# Patient Record
Sex: FEMALE | Race: BLACK OR AFRICAN AMERICAN | NOT HISPANIC OR LATINO
[De-identification: names, ages, dates, MRNs, and addresses within clinical notes are randomized per-mention and may not be internally consistent; named-entity substitution may affect disease eponyms.]

---

## 2017-01-09 PROBLEM — Z00.00 ENCOUNTER FOR PREVENTIVE HEALTH EXAMINATION: Status: ACTIVE | Noted: 2017-01-09

## 2017-01-11 ENCOUNTER — APPOINTMENT (OUTPATIENT)
Dept: COLORECTAL SURGERY | Facility: CLINIC | Age: 22
End: 2017-01-11

## 2017-01-11 VITALS
HEIGHT: 64 IN | SYSTOLIC BLOOD PRESSURE: 144 MMHG | RESPIRATION RATE: 14 BRPM | HEART RATE: 86 BPM | DIASTOLIC BLOOD PRESSURE: 82 MMHG | TEMPERATURE: 97.7 F | WEIGHT: 140 LBS | BODY MASS INDEX: 23.9 KG/M2

## 2017-09-01 ENCOUNTER — EMERGENCY (EMERGENCY)
Facility: HOSPITAL | Age: 22
LOS: 1 days | Discharge: HOME | End: 2017-09-01
Attending: EMERGENCY MEDICINE | Admitting: EMERGENCY MEDICINE
Payer: MEDICAID

## 2017-09-01 VITALS
SYSTOLIC BLOOD PRESSURE: 115 MMHG | HEART RATE: 73 BPM | DIASTOLIC BLOOD PRESSURE: 87 MMHG | OXYGEN SATURATION: 100 % | TEMPERATURE: 99 F | RESPIRATION RATE: 18 BRPM

## 2017-09-01 VITALS
SYSTOLIC BLOOD PRESSURE: 131 MMHG | RESPIRATION RATE: 18 BRPM | DIASTOLIC BLOOD PRESSURE: 93 MMHG | HEART RATE: 80 BPM | TEMPERATURE: 99 F | OXYGEN SATURATION: 100 %

## 2017-09-01 PROCEDURE — 99283 EMERGENCY DEPT VISIT LOW MDM: CPT

## 2017-09-01 PROCEDURE — 99284 EMERGENCY DEPT VISIT MOD MDM: CPT

## 2017-09-01 RX ORDER — ACETAMINOPHEN 500 MG
650 TABLET ORAL ONCE
Qty: 0 | Refills: 0 | Status: COMPLETED | OUTPATIENT
Start: 2017-09-01 | End: 2017-09-01

## 2017-09-01 RX ADMIN — Medication 650 MILLIGRAM(S): at 21:54

## 2017-09-01 RX ADMIN — Medication 650 MILLIGRAM(S): at 22:00

## 2017-09-01 NOTE — ED PROVIDER NOTE - ENMT, MLM
Airway patent, Nasal mucosa clear. Mouth with normal mucosa. Throat has no vesicles, no oropharyngeal exudates and uvula is midline.  Slight enamel fracture L lower central incisor without exposed dentin or pulp, no sensitivity and no loose teeth, able to break tongue depressor between teeth, no TMJ tenderness

## 2017-09-01 NOTE — ED ADULT NURSE NOTE - CHPI ED SYMPTOMS NEG
no fever/no decreased eating/drinking/no bleeding gums/no ear pain/no mouth sores/no nasal congestion
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
## 2017-09-01 NOTE — ED ADULT NURSE NOTE - CINV DISCH TEACH PARTICIP
rec'd call from Traci with [facility] stating that patient was admitted with them over the weekend after having a stroke. They do not expect patient so survive but a few more days.  Traci can be reached at [phone] for questions/concerns.   Patient/Parent(s)

## 2017-09-01 NOTE — ED PROVIDER NOTE - NEURO NEGATIVE STATEMENT, MLM
no loss of consciousness, no gait abnormality, +mild headache, no sensory deficits, and no weakness.
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
## 2017-09-01 NOTE — ED PROVIDER NOTE - OBJECTIVE STATEMENT
22F no PMH p/w chipped tooth.  She was preparing to perform trumpet at a soccer game and was hit in the forehead by a ball.  She had no LOC, no vomiting, has a mild headache, no neck pain, no numbness/weakness.  She bit down hard and chipped her left lower central incisor.  It feels sharp and irregular but not sensitive.  Bite is not abnormal.  No loose teeth.

## 2017-09-01 NOTE — ED PROVIDER NOTE - CARE PLAN
Principal Discharge DX:	Chipped tooth  Instructions for follow-up, activity and diet:	See your dentist on Tuesday of this coming week  Avoid biting and chewing with your front teeth  Take tylenol 650 mg every 6 hours as needed for pain, max daily dose 3000 mg  Observe over next 24-48 hours for worsening/change in condition  Avoid activities that may cause repeat head injury, do not restart contact sports until evaluated by primary doctor or sports medicine doctor.  Ease into normal activities as tolerated.  Avoid strenuous mental activity, driving, and bright screens for 1-2 days.  Return to the emergency department for any change or worsening of condition such as altered consciousness, confusion, lethargy, severe pain, nausea/vomiting, or if you have any other questions or concerns.

## 2017-09-01 NOTE — ED PROVIDER NOTE - PLAN OF CARE
See your dentist on Tuesday of this coming week  Avoid biting and chewing with your front teeth  Take tylenol 650 mg every 6 hours as needed for pain, max daily dose 3000 mg  Observe over next 24-48 hours for worsening/change in condition  Avoid activities that may cause repeat head injury, do not restart contact sports until evaluated by primary doctor or sports medicine doctor.  Ease into normal activities as tolerated.  Avoid strenuous mental activity, driving, and bright screens for 1-2 days.  Return to the emergency department for any change or worsening of condition such as altered consciousness, confusion, lethargy, severe pain, nausea/vomiting, or if you have any other questions or concerns.

## 2018-02-09 ENCOUNTER — APPOINTMENT (OUTPATIENT)
Dept: COLORECTAL SURGERY | Facility: CLINIC | Age: 23
End: 2018-02-09
Payer: MEDICAID

## 2018-02-09 VITALS
HEIGHT: 64 IN | HEART RATE: 90 BPM | BODY MASS INDEX: 23.9 KG/M2 | TEMPERATURE: 97.7 F | SYSTOLIC BLOOD PRESSURE: 136 MMHG | WEIGHT: 140 LBS | RESPIRATION RATE: 14 BRPM | DIASTOLIC BLOOD PRESSURE: 98 MMHG

## 2018-02-09 PROCEDURE — 46600 DIAGNOSTIC ANOSCOPY SPX: CPT

## 2018-02-09 PROCEDURE — 99214 OFFICE O/P EST MOD 30 MIN: CPT | Mod: 25

## 2018-03-15 ENCOUNTER — OTHER (OUTPATIENT)
Age: 23
End: 2018-03-15

## 2018-03-19 ENCOUNTER — OUTPATIENT (OUTPATIENT)
Dept: OUTPATIENT SERVICES | Facility: HOSPITAL | Age: 23
LOS: 1 days | End: 2018-03-19
Payer: MEDICAID

## 2018-03-19 ENCOUNTER — APPOINTMENT (OUTPATIENT)
Dept: MRI IMAGING | Facility: CLINIC | Age: 23
End: 2018-03-19
Payer: MEDICAID

## 2018-03-19 DIAGNOSIS — Z00.8 ENCOUNTER FOR OTHER GENERAL EXAMINATION: ICD-10-CM

## 2018-03-19 PROCEDURE — 72195 MRI PELVIS W/O DYE: CPT

## 2018-03-19 PROCEDURE — 72195 MRI PELVIS W/O DYE: CPT | Mod: 26

## 2018-03-26 ENCOUNTER — EMERGENCY (EMERGENCY)
Facility: HOSPITAL | Age: 23
LOS: 1 days | Discharge: HOME | End: 2018-03-26
Attending: EMERGENCY MEDICINE | Admitting: EMERGENCY MEDICINE
Payer: COMMERCIAL

## 2018-03-26 VITALS
OXYGEN SATURATION: 100 % | DIASTOLIC BLOOD PRESSURE: 93 MMHG | HEART RATE: 72 BPM | RESPIRATION RATE: 18 BRPM | SYSTOLIC BLOOD PRESSURE: 147 MMHG | TEMPERATURE: 99 F

## 2018-03-26 PROCEDURE — 99282 EMERGENCY DEPT VISIT SF MDM: CPT

## 2018-03-26 RX ORDER — ACETAMINOPHEN 500 MG
650 TABLET ORAL ONCE
Qty: 0 | Refills: 0 | Status: COMPLETED | OUTPATIENT
Start: 2018-03-26 | End: 2018-03-26

## 2018-03-26 RX ADMIN — Medication 650 MILLIGRAM(S): at 22:25
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
## 2018-03-26 NOTE — ED PROVIDER NOTE - MUSCULOSKELETAL NECK EXAM
No C-spine tenderness, normal ROM, non-tender neck/no deformity, pain or tenderness. no restriction of movement
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
## 2018-04-13 ENCOUNTER — APPOINTMENT (OUTPATIENT)
Dept: COLORECTAL SURGERY | Facility: CLINIC | Age: 23
End: 2018-04-13
Payer: COMMERCIAL

## 2018-04-13 VITALS
DIASTOLIC BLOOD PRESSURE: 87 MMHG | HEIGHT: 64 IN | RESPIRATION RATE: 14 BRPM | WEIGHT: 145 LBS | BODY MASS INDEX: 24.75 KG/M2 | SYSTOLIC BLOOD PRESSURE: 132 MMHG | HEART RATE: 81 BPM

## 2018-04-13 DIAGNOSIS — R14.3 FLATULENCE: ICD-10-CM

## 2018-04-13 PROCEDURE — 99214 OFFICE O/P EST MOD 30 MIN: CPT

## 2018-07-02 ENCOUNTER — APPOINTMENT (OUTPATIENT)
Dept: UROGYNECOLOGY | Facility: CLINIC | Age: 23
End: 2018-07-02

## 2018-07-17 ENCOUNTER — APPOINTMENT (OUTPATIENT)
Dept: COLORECTAL SURGERY | Facility: CLINIC | Age: 23
End: 2018-07-17
Payer: MEDICAID

## 2018-07-17 VITALS
HEART RATE: 81 BPM | SYSTOLIC BLOOD PRESSURE: 120 MMHG | RESPIRATION RATE: 14 BRPM | DIASTOLIC BLOOD PRESSURE: 83 MMHG | HEIGHT: 64 IN | WEIGHT: 145 LBS | BODY MASS INDEX: 24.75 KG/M2 | TEMPERATURE: 97.9 F

## 2018-07-17 PROCEDURE — 46600 DIAGNOSTIC ANOSCOPY SPX: CPT

## 2018-07-17 PROCEDURE — 99214 OFFICE O/P EST MOD 30 MIN: CPT | Mod: 25

## 2018-11-21 NOTE — ED ADULT NURSE NOTE - NS TRANSFER PATIENT BELONGINGS
PHYSICIAN NEXT STEPS:   Review Only      CHIEF COMPLAINT:   Chief Complaint/Protocol Used: Cough   Onset: 5 days ago          ASSESSMENT:   » Did not know what to do True   » Onset: 5 days ago    » No, but easily roused True   » Shows appropriate response reaction to stimuli or requested action True   » Patient acting appropriately True   » Very decreased True   » Decreased True   » Normal True   » Normal, no trouble breathing True   » Severity: moderate    -------------------------------------------------------      DISPOSITION:   Disposition Recommendation: See Physician within 24 Hours   Questions that led to disposition:   » [1] Age > 1 year AND [2] continuous (non-stop) coughing keeps from feeding and sleeping AND [3] no improvement using cough treatment per guideline   Patient Directed To: Unspecified   Patient Intended Action: Seek care in the doctor's office          » Appointment made for patient at [facility] with Dr. Babin on 1/25/18 at 11:45AM.      DISPOSITION OVERRIDE/PROVIDER CONSULT:   Disposition Override: N/A   Override Source: Unspecified   Consulted with PCP: No   Consulted with On-Call Physician: No         CALLER CONTACT INFO:   Name: HMIA LUNA (Self)   Phone 1: [phone] (Home)   Phone 2: [phone] (Cell)   Phone 3: [phone] - Preferred         ENCOUNTER STARTED:   01/25/18 09:24:54 AM   ENCOUNTER ASSIGNED TO/CLOSED BY:   jonna piper @ 01/25/18 09:45:06 AM         -------------------------------------------------------      CARE ADVICE given per Cough guideline.   HOMEMADE COUGH MEDICINE:    * AGE: 3 Months to 1 year:   * Give warm clear fluids (e.g., water or apple juice) to thin the mucus and relax the airway. Dosage: 1-3 teaspoons (5-15 ml) four times per day.   * Note to [facility]: Option to be discussed only if caller complains that nothing else helps: Give a small amount of corn syrup. Dosage: 1/4 teaspoon (1 ml). Can give up to 4 times a day when coughing.  Caution: Avoid honey until 1 year old (Reason: risk for botulism).   * AGE 1 year and older: Use HONEY 1/2 to 1 tsp (2 to 5 ml) as needed as a homemade cough medicine. It can thin the secretions and loosen the cough. (If not available, can use corn syrup.)   * AGE 6 years and older: Use COUGH DROPS to decrease the tickle in the throat. (If not available, can use hard candy.); HUMIDIFIER:    * If the air is dry, use a humidifier in the bedroom (Reason: dry air makes coughs worse).    * Avoid menthol vapors (Reason: makes coughs worse).; FLUIDS - OFFER MORE:    * Encourage your child to drink adequate fluids to prevent dehydration.    * This will also thin out the nasal secretions and loosen the phlegm in the lungs.         UNDERSTANDS CARE ADVICE: Yes      AGREES WITH CARE ADVICE: Yes      WILL FOLLOW CARE ADVICE: Yes      -------------------------------------------------------      Electronically signed by:ANTOLIN LIGHT M.D.  Jan 25 2018 10:16AM CST     Clothing

## 2019-02-05 ENCOUNTER — OTHER (OUTPATIENT)
Age: 24
End: 2019-02-05

## 2019-02-05 DIAGNOSIS — K62.89 OTHER SPECIFIED DISEASES OF ANUS AND RECTUM: ICD-10-CM

## 2020-01-21 ENCOUNTER — TRANSCRIPTION ENCOUNTER (OUTPATIENT)
Age: 25
End: 2020-01-21

## 2021-07-27 ENCOUNTER — TRANSCRIPTION ENCOUNTER (OUTPATIENT)
Age: 26
End: 2021-07-27

## 2021-07-27 ENCOUNTER — APPOINTMENT (OUTPATIENT)
Dept: UROGYNECOLOGY | Facility: CLINIC | Age: 26
End: 2021-07-27
Payer: MEDICAID

## 2021-07-27 VITALS
TEMPERATURE: 97.9 F | HEART RATE: 88 BPM | WEIGHT: 146 LBS | DIASTOLIC BLOOD PRESSURE: 71 MMHG | HEIGHT: 64 IN | SYSTOLIC BLOOD PRESSURE: 121 MMHG | BODY MASS INDEX: 24.92 KG/M2 | OXYGEN SATURATION: 99 %

## 2021-07-27 DIAGNOSIS — M79.18 MYALGIA, OTHER SITE: ICD-10-CM

## 2021-07-27 DIAGNOSIS — R39.11 HESITANCY OF MICTURITION: ICD-10-CM

## 2021-07-27 DIAGNOSIS — R35.0 FREQUENCY OF MICTURITION: ICD-10-CM

## 2021-07-27 PROCEDURE — 99205 OFFICE O/P NEW HI 60 MIN: CPT

## 2021-07-27 RX ORDER — DIAZEPAM 100 %
POWDER (GRAM) MISCELLANEOUS
Qty: 30 | Refills: 0 | Status: ACTIVE | COMMUNITY
Start: 2021-07-27 | End: 1900-01-01

## 2021-07-27 NOTE — HISTORY OF PRESENT ILLNESS
[FreeTextEntry1] : This is a 26-year-old with a long colorectal history who indicates that at 18 years of age. She had blood in the stool. She saw GI and colorectal who felt it was increased gas, hemorrhoids, and increased pelvic floor tone. The anatomy was negative. She was given physical therapy for pelvic floor. Both vaginal and rectal stretching, but it did not help. She feels pelvic heaviness extracting pulsation in the rectum. She sexually active now. However 6 is previously painful, but after the physical therapy and was comfortable. She feels pulsation and discomfort in the rectum. She was first actually active at 14 years of age and had rectal 6 one to 2 times without since that time. She was constipated as a child and feels that the stool has to go over a ridge before being evacuated.  At times she has to strain but suggests she does not have a  major constipation problem at present.\par \par \par On examination, the general exam is normal. She is nondistended. The nares. Massive 5 out of 5 spasm global pelvic floor, bilateral in all muscle groups. The rectum is tight, and has banding bilaterally with pain to palpation of the puborectalis .  There is minimal vaginal prolapse. There is a small, but acute angle, perineal rectocele, examine this region. She does feel that this is the area where still becomes entrapped.\par \par is my impression that this patient has pelvic floor dysfunction with significant rectal symptoms including pain driving or dysfunction.  I am uncertain, whether it's related to her flatulence.  \par \par Recommendations\par \par digital reduction of  peroneocele - hesitant to recommend repair as any introduction of pain/ sutures will increase spasm. \par \par Consult with Dr Holden for CRS input on defec dysfunction & consider trans rectal botox / stretch\par \par self myofacial release -- she has learned techniques from pelvic pt and I have reviewed. \par \par Valium per vagina 5-10 mg (counseling off label and risks).   Consider trans rectal muscle relaxant - await Dr Holden input

## 2021-07-27 NOTE — LETTER BODY
[Dear  ___] : Dear  [unfilled], [I had the pleasure of evaluating your patient, [unfilled]. Thank you for referring Ms. [unfilled] for consultation for ___] : I had the pleasure of evaluating your patient, [unfilled]. Thank you for referring Ms. [unfilled] for consultation for [unfilled]. [Attached please find my note.] : Attached please find my note. [DrJamel  ___] : Dr. GURROLA

## 2021-07-28 RX ORDER — NORETHINDRONE ACETATE AND ETHINYL ESTRADIOL AND FERROUS FUMARATE 1MG-20(21)
KIT ORAL
Refills: 0 | Status: ACTIVE | COMMUNITY

## 2021-08-22 ENCOUNTER — TRANSCRIPTION ENCOUNTER (OUTPATIENT)
Age: 26
End: 2021-08-22

## 2021-09-23 ENCOUNTER — APPOINTMENT (OUTPATIENT)
Dept: SURGERY | Facility: CLINIC | Age: 26
End: 2021-09-23
Payer: MEDICAID

## 2021-09-23 VITALS
WEIGHT: 180 LBS | OXYGEN SATURATION: 99 % | BODY MASS INDEX: 29.99 KG/M2 | TEMPERATURE: 97.7 F | DIASTOLIC BLOOD PRESSURE: 90 MMHG | HEART RATE: 72 BPM | SYSTOLIC BLOOD PRESSURE: 133 MMHG | HEIGHT: 65 IN | RESPIRATION RATE: 16 BRPM

## 2021-09-23 PROCEDURE — 46600 DIAGNOSTIC ANOSCOPY SPX: CPT

## 2021-09-23 PROCEDURE — 99203 OFFICE O/P NEW LOW 30 MIN: CPT | Mod: 25

## 2021-09-23 RX ORDER — MULTIVITAMIN
TABLET ORAL
Refills: 0 | Status: ACTIVE | COMMUNITY

## 2021-09-23 NOTE — CONSULT LETTER
[Dear  ___] : Dear  [unfilled], [Consult Letter:] : I had the pleasure of evaluating your patient, [unfilled]. [Please see my note below.] : Please see my note below. [Consult Closing:] : Thank you very much for allowing me to participate in the care of this patient.  If you have any questions, please do not hesitate to contact me. [Sincerely,] : Sincerely, [DrJamel  ___] : Dr. GURROLA [FreeTextEntry2] : Dr. Abdon Eduardo [FreeTextEntry3] : Anant Holden M.D., PAM.MAR., F.URSULA.S.YADIRARJamelS.\[facility] Chief Colorectal Clinical Services, [place] [facility]

## 2021-09-23 NOTE — PHYSICAL EXAM
[Normal Breath Sounds] : Normal breath sounds [Normal Heart Sounds] : normal heart sounds [No Rash or Lesion] : No rash or lesion [Alert] : alert [Oriented to Person] : oriented to person [Oriented to Place] : oriented to place [Oriented to Time] : oriented to time [Calm] : calm [JVD] : no jugular venous distention  [de-identified] : WNL [de-identified] : WNL [de-identified] : ROM WNL [FreeTextEntry1] : Perianal inspection unremarkable.  Digital exam revealed a small rectocele.  Anoscopy unremarkable.

## 2021-09-23 NOTE — ASSESSMENT
[FreeTextEntry1] : I have reviewed chart and corroborated all nursing input into this note.  Patient's primary complaint is incontinence of flatus at this time.  She is able to evacuate stool successfully.  She occasionally has to splint to evacuate.  She has been through 2 courses of pelvic floor physical therapy and has undergone trials of Gas-X and charcoal pills.  She has eliminated gas producing foods from her diet.  However she continues to have episodes of incontinence of flatus.  Unfortunately, there is no other treatment available at this time to help with this problem.  There is no role for anal manometry since it will not alter management.  There is no role for Botox injections which can be used for sphincter spasm and obstructed defecation.  In this circumstance, it would only weaken the sphincters and potentially exacerbate the patient's incontinence.

## 2021-11-02 ENCOUNTER — APPOINTMENT (OUTPATIENT)
Dept: UROGYNECOLOGY | Facility: CLINIC | Age: 26
End: 2021-11-02
Payer: MEDICAID

## 2021-11-02 VITALS — SYSTOLIC BLOOD PRESSURE: 132 MMHG | TEMPERATURE: 97.9 F | DIASTOLIC BLOOD PRESSURE: 97 MMHG | HEART RATE: 83 BPM

## 2021-11-02 PROCEDURE — 99213 OFFICE O/P EST LOW 20 MIN: CPT

## 2021-11-02 NOTE — HISTORY OF PRESENT ILLNESS
[FreeTextEntry1] : 28 year old with defecatory dysfunction since young childhood.  Tobin chief complains of gas incontinence and feeling :" off during intercourse." Initial assessment noted high pelvic floor tone and small peroneal rectocele. \par \par Dr Holden assessment : no intervention or additional advisement. \par \par In the interim she had intercourse for the first time in sev yrs.  Her baseline colorectal problems improved follwing for 2 days and she felt better having something in vagina.  \par \par She felt same improvement using metrual cup.\par \par We discussed tyrial of pessaary.  It is certainly not expected with high pelvic tone and no prolapse to have relief as described but with her story it is reasonable to see how she feel with pessary in place.\par \par Gas problem by todays discussion seems to be intol of excess fiber and diet dominated by fiber.  Suggested adjusting diet\par \par return for pessary trial. \par \par Counseling was greater than 50 percent of encounter which included  32   minute face to face time for direct counseling.\par \par A chaperone was present for the entirety of the physical examination and for the exam room portion of patient interview\par

## 2021-11-15 ENCOUNTER — EMERGENCY (EMERGENCY)
Facility: HOSPITAL | Age: 26
LOS: 1 days | Discharge: HOME | End: 2021-11-15
Attending: STUDENT IN AN ORGANIZED HEALTH CARE EDUCATION/TRAINING PROGRAM
Payer: MEDICAID

## 2021-11-15 VITALS
SYSTOLIC BLOOD PRESSURE: 149 MMHG | TEMPERATURE: 98 F | OXYGEN SATURATION: 98 % | DIASTOLIC BLOOD PRESSURE: 93 MMHG | RESPIRATION RATE: 18 BRPM | HEART RATE: 86 BPM

## 2021-11-15 VITALS
HEIGHT: 65 IN | RESPIRATION RATE: 20 BRPM | WEIGHT: 190.04 LBS | TEMPERATURE: 98 F | SYSTOLIC BLOOD PRESSURE: 146 MMHG | HEART RATE: 89 BPM | DIASTOLIC BLOOD PRESSURE: 106 MMHG | OXYGEN SATURATION: 96 %

## 2021-11-15 PROCEDURE — 71045 X-RAY EXAM CHEST 1 VIEW: CPT

## 2021-11-15 PROCEDURE — 99284 EMERGENCY DEPT VISIT MOD MDM: CPT

## 2021-11-15 PROCEDURE — 99284 EMERGENCY DEPT VISIT MOD MDM: CPT | Mod: 25

## 2021-11-15 PROCEDURE — 94640 AIRWAY INHALATION TREATMENT: CPT

## 2021-11-15 PROCEDURE — 71045 X-RAY EXAM CHEST 1 VIEW: CPT | Mod: 26

## 2021-11-15 RX ORDER — DEXAMETHASONE 0.5 MG/5ML
6 ELIXIR ORAL ONCE
Refills: 0 | Status: COMPLETED | OUTPATIENT
Start: 2021-11-15 | End: 2021-11-15

## 2021-11-15 RX ORDER — IPRATROPIUM/ALBUTEROL SULFATE 18-103MCG
3 AEROSOL WITH ADAPTER (GRAM) INHALATION ONCE
Refills: 0 | Status: COMPLETED | OUTPATIENT
Start: 2021-11-15 | End: 2021-11-15

## 2021-11-15 RX ADMIN — Medication 6 MILLIGRAM(S): at 04:50

## 2021-11-15 RX ADMIN — Medication 3 MILLILITER(S): at 04:50

## 2021-11-15 RX ADMIN — Medication 3 MILLILITER(S): at 05:52

## 2021-11-15 NOTE — ED PROVIDER NOTE - NS ED ROS FT
CONSTITUTIONAL: No fevers, no chills, no lightheadedness, no dizziness  NOSE: no nasal congestion  MOUTH/THROAT: no sore throat  CV: No chest pain, no palpitations  RESP: + SOB, no cough  GI: No n/v  : no dysuria, no hematuria  SKIN: no rashes  NEURO: no headache, no focal weakness, no decreased sensation/paresthesias

## 2021-11-15 NOTE — ED PROVIDER NOTE - OBJECTIVE STATEMENT
26y F w/ no sig PMHx presenting with sob x2 days. States Saturday was at a party, got into her car to leave and noticed chest tightness and difficulty breathing. States she had trouble sleeping that night and yesterday, her symptoms persisted, with wheezing. Denies previous hx of asthma, but brother has dx of asthma. Denies fever, chills, cough, congestion, nausea, vomiting. Has allergy to tree nuts. Denies known trigger at party.

## 2021-11-15 NOTE — ED ADULT NURSE NOTE - OBJECTIVE STATEMENT
Pt is a 26 yr old female with no pmh only on BC coming from home for sob. Pt states she was out with friends this weekend and on Saturday afternoon, could not walk up the stairs without feeling sob. Pt states she has had chest tightness/wheezing since Saturday. Pt denies sick contacts, is fully vaccinated, and denies cough or fever. Pt is a/o x3- vitals stable- pt does have an upper airway wheeze upon auscultation.

## 2021-11-15 NOTE — ED PROVIDER NOTE - ATTENDING CONTRIBUTION TO CARE
26 year old female with no significant past medical history presented to ED with shortness of breath. No fever or URI symptom. No history of asthma. Pt well appearing on exam. speaking in full sentences, no retraction. +scattered wheezing noted. Likely new onset asthma. Plan: Albuterol, iv decadron. Reassess

## 2021-11-15 NOTE — ED PROVIDER NOTE - PATIENT PORTAL LINK FT
You can access the FollowMyHealth Patient Portal offered by [facility] by registering at the following website: [email or web address]. By joining [facility]’s FollowMyHealth portal, you will also be able to view your health information using other applications (apps) compatible with our system.

## 2021-11-15 NOTE — ED PROVIDER NOTE - CLINICAL SUMMARY MEDICAL DECISION MAKING FREE TEXT BOX
26y F w/ no sig PMHx presenting with sob x2 days. States Saturday was at a party, got into her car to leave and noticed chest tightness and difficulty breathing. +B/L end-exp wheezing appreciated. Not tachycardic, 02 sat 100% on RA, no resp distress, do not clinically suspect PE upon presentation. Will treat with duonebs and steroids, obtain CXR, reassess.

## 2021-11-15 NOTE — ED PROVIDER NOTE - PHYSICAL EXAMINATION
Physical Exam:  Gen: NAD, AOx3, non-toxic appearing, able to ambulate without assistance  HEENT:  tongue midline, oral mucosa moist  Lung: +B/L end-expiratory wheezing, no respiratory distress, speaking in full sentences  CV: RRR, no murmurs, rubs or gallops  Abd: soft, NT, ND  Neuro: No focal sensory or motor deficits  Skin: Warm, well perfused, no rash, no leg swelling  Psych: normal affect, calm  Lissa Villafuerte D.O. -Resident

## 2021-11-15 NOTE — ED PROVIDER NOTE - PROGRESS NOTE DETAILS
Christo BOWDEN (PGY-3): Wheezing improved after 1st duoneb, will give second and reassess. Patient remains comfortable appearing. Dr. Adrianne Reich: Pt reassessed at bedside. Wheezing resolved. Pt states she's feeling a lot better and wants to go home. Will discharge home with PCP follow up. Strict return precautions given. Dr. Adrianne Reich: Pt reassessed at bedside. Wheezing resolved. Pt states she's feeling a lot better and wants to go home. Will discharge home with PCP follow up. Albuterol MDI given to pt. Strict return precautions given.

## 2021-11-16 ENCOUNTER — TRANSCRIPTION ENCOUNTER (OUTPATIENT)
Age: 26
End: 2021-11-16

## 2021-11-19 ENCOUNTER — APPOINTMENT (OUTPATIENT)
Dept: UROGYNECOLOGY | Facility: CLINIC | Age: 26
End: 2021-11-19
Payer: MEDICAID

## 2021-11-19 VITALS
WEIGHT: 180 LBS | SYSTOLIC BLOOD PRESSURE: 118 MMHG | BODY MASS INDEX: 29.99 KG/M2 | HEART RATE: 72 BPM | DIASTOLIC BLOOD PRESSURE: 66 MMHG | HEIGHT: 65 IN | TEMPERATURE: 96.7 F | OXYGEN SATURATION: 98 %

## 2021-11-19 DIAGNOSIS — N81.9 FEMALE GENITAL PROLAPSE, UNSPECIFIED: ICD-10-CM

## 2021-11-19 PROCEDURE — A4562: CPT

## 2021-11-19 PROCEDURE — 99213 OFFICE O/P EST LOW 20 MIN: CPT

## 2021-11-19 NOTE — HISTORY OF PRESENT ILLNESS
[FreeTextEntry1] : This 26-year-old patient has a chief complaint of involuntary gas passing from the rectum. She had colorectal evaluation other than dietary modification. There is no other recommended intervention. The patient has noted that when using a vaginal cup. Premenstrual purposes. The rectal symptoms are markedly diminished. It is my office as that the space occupying cup is causing some impingement on the rectum to control the gas.\par \par A sinus observation to light of a pessary trial.\par \par Examination was performed. A #2 ring was seen to hold with voiding ambulation and Valsalva.   Instructions were insertion and removal were reviewed. Counseling regarding risks and benefits, and guidelines when to come for earlier intervention were described in detail.   She'll remove the pessary and leave it at one night per week.\par \par She will return in 3 months to advise us on habits as affected her symptom control\par \par \par Counseling was greater than 50 percent of encounter which included  36   minute face to face time for direct counseling.\par \par A chaperone was present for the entirety of the physical examination and for the exam room portion of patient interview\par

## 2021-11-29 ENCOUNTER — APPOINTMENT (OUTPATIENT)
Dept: COLORECTAL SURGERY | Facility: CLINIC | Age: 26
End: 2021-11-29
Payer: MEDICAID

## 2021-11-29 VITALS
HEART RATE: 71 BPM | WEIGHT: 180 LBS | HEIGHT: 65 IN | BODY MASS INDEX: 29.99 KG/M2 | DIASTOLIC BLOOD PRESSURE: 96 MMHG | SYSTOLIC BLOOD PRESSURE: 133 MMHG

## 2021-11-29 DIAGNOSIS — R15.9 FULL INCONTINENCE OF FECES: ICD-10-CM

## 2021-11-29 PROCEDURE — 46600 DIAGNOSTIC ANOSCOPY SPX: CPT

## 2021-11-29 PROCEDURE — 99203 OFFICE O/P NEW LOW 30 MIN: CPT | Mod: 25

## 2021-11-30 PROBLEM — R15.9 ANAL SPHINCTER INCONTINENCE: Status: ACTIVE | Noted: 2021-09-23

## 2021-11-30 NOTE — CONSULT LETTER
[Dear  ___] : Dear  [unfilled], [Consult Letter:] : I had the pleasure of evaluating your patient, [unfilled]. [Please see my note below.] : Please see my note below. [Consult Closing:] : Thank you very much for allowing me to participate in the care of this patient.  If you have any questions, please do not hesitate to contact me. [Sincerely,] : Sincerely, [FreeTextEntry3] : Mihai aPez MD\par

## 2021-11-30 NOTE — HISTORY OF PRESENT ILLNESS
[FreeTextEntry1] : 26 year old female who for consultation for pelvic floor issues.  She has been seen in the past for similar problems.  She complains of incontinence specific to flatus all long.  She has undergone  pelvic floor physical therapy which did not help her symptoms.  She describes episodes where she feels gas traveling down her lower back and she is unable to control it until it is too late.  She has no stool incontinence or leakage.  She previously had issues with constipation but this is improved.  She has noticed that the use of a menstrual cup actually helps her episodes of gas incontinence and she has been seen by your urogynecologist and plans to use a pessary to help this issue.  She has gained about 50 pounds over the past year due to the Covid pandemic.  She is in therapy to deal with stress. \par \par  Prior MRI defecography in 2018 showed small rectocele and lack of evacuation of rectal contrast. She splints to evacuate stool when constipated but rarely. \par

## 2021-11-30 NOTE — PHYSICAL EXAM
[Excoriation] : no perianal excoriation [Fistula] : no fistulas [Normal] : was normal [None] : there was no rectal mass  [Gross Blood] : no gross blood [Respiratory Effort] : normal respiratory effort [Normal Rate and Rhythm] : normal rate and rhythm [Calm] : calm [de-identified] : Soft, nontender, nondistended [de-identified] : grade 1 internal hemorrhoids [de-identified] : well appearing, in no distress [de-identified] : normocephalic, atraumatic [de-identified] : moves extremities without difficulty [de-identified] : warm and dry [de-identified] : alert and oriented x 3

## 2022-02-05 NOTE — ED ADULT TRIAGE NOTE - WILL THE PATIENT ACCEPT THE PFIZER COVID-19 VACCINE IF ELIGIBLE AND IT IS AVAILABLE?
· Continue Telemetry monitoring  · EKG revealed Afib with   · Currently not on rate control medication   · PRN Lopressor for rate control  · On warfarin 4 mg as outpatient- not taking PO meds, so changed to heparin gtt 1/31  All treatments discontinued on 02/04 for comfort care  Not applicable

## 2022-02-18 ENCOUNTER — APPOINTMENT (OUTPATIENT)
Dept: UROGYNECOLOGY | Facility: CLINIC | Age: 27
End: 2022-02-18

## 2022-05-13 ENCOUNTER — APPOINTMENT (OUTPATIENT)
Dept: UROGYNECOLOGY | Facility: CLINIC | Age: 27
End: 2022-05-13
Payer: COMMERCIAL

## 2022-05-13 VITALS
OXYGEN SATURATION: 98 % | DIASTOLIC BLOOD PRESSURE: 86 MMHG | TEMPERATURE: 97.9 F | WEIGHT: 180 LBS | HEIGHT: 65 IN | BODY MASS INDEX: 29.99 KG/M2 | HEART RATE: 75 BPM | SYSTOLIC BLOOD PRESSURE: 130 MMHG

## 2022-05-13 DIAGNOSIS — M62.838 OTHER MUSCLE SPASM: ICD-10-CM

## 2022-05-13 DIAGNOSIS — K59.00 CONSTIPATION, UNSPECIFIED: ICD-10-CM

## 2022-05-13 PROCEDURE — 99213 OFFICE O/P EST LOW 20 MIN: CPT

## 2022-05-13 NOTE — HISTORY OF PRESENT ILLNESS
[FreeTextEntry1] : 26 year old with chronic constipation and PFD.  Has had MRI shows rectocele and inc emptying of gel.  No prolapse.  Manages with PF phys therapy and vag diazepam.\par \par Her main complaint is gas passing per rectum as well as frequency of stool.  Her baseline is 2-4 bowel movements a day and if she has a salad she will have bowel movements between 4 and 8 times a day.\par \par \par She occasionally still has some clenching from pelvic floor spasm but I indicated I am reluctant to give her muscle relaxants as the rectal sphincter certainly will be more apt to seepage of gas which is one of her main complaints.  Pelvic floor spasm is certainly minor for her at this point.  Given that her dominant symptoms clearly seem to be the composition of stool stool transit and frequency of stool I have highly encouraged her to seek additional advisement through gastroenterology and nutrition.\par \par \par On examination the vagina has slightly increased tone but is not painful.  At the exam is otherwise negative.  The rectum has normal to slightly increased tone.\par \par \par This point she will have formal follow-up with my practice as I feel her focus needs to be with gastroenterology focusing on frequency and composition of stool and gas\par \par Review of previous notes, labs, current prescriptions was performed.\par History , Physical counseling was performed. \par All questions answered in lay language\par Total time for encounter was    39   min\par A chaperone was present for the entirety of the encounter\par \par

## 2022-09-30 ENCOUNTER — LABORATORY RESULT (OUTPATIENT)
Age: 27
End: 2022-09-30

## 2022-09-30 ENCOUNTER — APPOINTMENT (OUTPATIENT)
Dept: GYNECOLOGIC ONCOLOGY | Facility: CLINIC | Age: 27
End: 2022-09-30

## 2022-09-30 VITALS
SYSTOLIC BLOOD PRESSURE: 138 MMHG | HEART RATE: 96 BPM | BODY MASS INDEX: 29.99 KG/M2 | WEIGHT: 180 LBS | DIASTOLIC BLOOD PRESSURE: 89 MMHG | HEIGHT: 65 IN

## 2022-09-30 DIAGNOSIS — R87.619 UNSPECIFIED ABNORMAL CYTOLOGICAL FINDINGS IN SPECIMENS FROM CERVIX UTERI: ICD-10-CM

## 2022-09-30 DIAGNOSIS — Z87.19 PERSONAL HISTORY OF OTHER DISEASES OF THE DIGESTIVE SYSTEM: ICD-10-CM

## 2022-09-30 DIAGNOSIS — N90.3 DYSPLASIA OF VULVA, UNSPECIFIED: ICD-10-CM

## 2022-09-30 DIAGNOSIS — Z80.6 FAMILY HISTORY OF LEUKEMIA: ICD-10-CM

## 2022-09-30 DIAGNOSIS — Z80.42 FAMILY HISTORY OF MALIGNANT NEOPLASM OF PROSTATE: ICD-10-CM

## 2022-09-30 DIAGNOSIS — Z78.9 OTHER SPECIFIED HEALTH STATUS: ICD-10-CM

## 2022-09-30 DIAGNOSIS — Z82.49 FAMILY HISTORY OF ISCHEMIC HEART DISEASE AND OTHER DISEASES OF THE CIRCULATORY SYSTEM: ICD-10-CM

## 2022-09-30 LAB
BASOPHILS # BLD AUTO: 0.09 K/UL
BASOPHILS NFR BLD AUTO: 1.5 %
EOSINOPHIL # BLD AUTO: 0.25 K/UL
EOSINOPHIL NFR BLD AUTO: 4.2 %
HCT VFR BLD CALC: 39.1 %
HGB BLD-MCNC: 12.4 G/DL
IMM GRANULOCYTES NFR BLD AUTO: 0.3 %
LYMPHOCYTES # BLD AUTO: 2.79 K/UL
LYMPHOCYTES NFR BLD AUTO: 47.4 %
MAN DIFF?: NORMAL
MCHC RBC-ENTMCNC: 27.3 PG
MCHC RBC-ENTMCNC: 31.7 GM/DL
MCV RBC AUTO: 86.1 FL
MONOCYTES # BLD AUTO: 0.33 K/UL
MONOCYTES NFR BLD AUTO: 5.6 %
NEUTROPHILS # BLD AUTO: 2.41 K/UL
NEUTROPHILS NFR BLD AUTO: 41 %
PLATELET # BLD AUTO: 280 K/UL
RBC # BLD: 4.54 M/UL
RBC # FLD: 14.6 %
WBC # FLD AUTO: 5.89 K/UL

## 2022-09-30 PROCEDURE — 99205 OFFICE O/P NEW HI 60 MIN: CPT

## 2022-10-01 LAB
HIV1+2 AB SPEC QL IA.RAPID: NONREACTIVE
HPV HIGH+LOW RISK DNA PNL CVX: NOT DETECTED

## 2022-10-02 LAB — T PALLIDUM AB SER QL IA: NEGATIVE

## 2022-10-07 LAB — CYTOLOGY CVX/VAG DOC THIN PREP: ABNORMAL

## 2022-10-13 ENCOUNTER — OUTPATIENT (OUTPATIENT)
Dept: OUTPATIENT SERVICES | Facility: HOSPITAL | Age: 27
LOS: 1 days | End: 2022-10-13
Payer: COMMERCIAL

## 2022-10-13 VITALS
WEIGHT: 179.9 LBS | DIASTOLIC BLOOD PRESSURE: 84 MMHG | OXYGEN SATURATION: 100 % | RESPIRATION RATE: 16 BRPM | TEMPERATURE: 99 F | HEIGHT: 65 IN | SYSTOLIC BLOOD PRESSURE: 128 MMHG | HEART RATE: 78 BPM

## 2022-10-13 DIAGNOSIS — Z01.812 ENCOUNTER FOR PREPROCEDURAL LABORATORY EXAMINATION: ICD-10-CM

## 2022-10-13 DIAGNOSIS — R87.619 UNSPECIFIED ABNORMAL CYTOLOGICAL FINDINGS IN SPECIMENS FROM CERVIX UTERI: ICD-10-CM

## 2022-10-13 DIAGNOSIS — N90.3 DYSPLASIA OF VULVA, UNSPECIFIED: ICD-10-CM

## 2022-10-13 DIAGNOSIS — Z78.9 OTHER SPECIFIED HEALTH STATUS: Chronic | ICD-10-CM

## 2022-10-13 LAB
ANION GAP SERPL CALC-SCNC: 8 MMOL/L — SIGNIFICANT CHANGE UP (ref 5–17)
APTT BLD: 32.4 SEC — SIGNIFICANT CHANGE UP (ref 27.5–35.5)
BLD GP AB SCN SERPL QL: SIGNIFICANT CHANGE UP
BUN SERPL-MCNC: 12 MG/DL — SIGNIFICANT CHANGE UP (ref 7–18)
CALCIUM SERPL-MCNC: 9.3 MG/DL — SIGNIFICANT CHANGE UP (ref 8.4–10.5)
CHLORIDE SERPL-SCNC: 111 MMOL/L — HIGH (ref 96–108)
CO2 SERPL-SCNC: 23 MMOL/L — SIGNIFICANT CHANGE UP (ref 22–31)
CREAT SERPL-MCNC: 0.92 MG/DL — SIGNIFICANT CHANGE UP (ref 0.5–1.3)
EGFR: 89 ML/MIN/1.73M2 — SIGNIFICANT CHANGE UP
GLUCOSE SERPL-MCNC: 101 MG/DL — HIGH (ref 70–99)
HCT VFR BLD CALC: 39.3 % — SIGNIFICANT CHANGE UP (ref 34.5–45)
HGB BLD-MCNC: 12.3 G/DL — SIGNIFICANT CHANGE UP (ref 11.5–15.5)
INR BLD: 1.03 RATIO — SIGNIFICANT CHANGE UP (ref 0.88–1.16)
MCHC RBC-ENTMCNC: 26.9 PG — LOW (ref 27–34)
MCHC RBC-ENTMCNC: 31.3 GM/DL — LOW (ref 32–36)
MCV RBC AUTO: 85.8 FL — SIGNIFICANT CHANGE UP (ref 80–100)
NRBC # BLD: 0 /100 WBCS — SIGNIFICANT CHANGE UP (ref 0–0)
PLATELET # BLD AUTO: 274 K/UL — SIGNIFICANT CHANGE UP (ref 150–400)
POTASSIUM SERPL-MCNC: 5.1 MMOL/L — SIGNIFICANT CHANGE UP (ref 3.5–5.3)
POTASSIUM SERPL-SCNC: 5.1 MMOL/L — SIGNIFICANT CHANGE UP (ref 3.5–5.3)
PROTHROM AB SERPL-ACNC: 12.3 SEC — SIGNIFICANT CHANGE UP (ref 10.5–13.4)
RBC # BLD: 4.58 M/UL — SIGNIFICANT CHANGE UP (ref 3.8–5.2)
RBC # FLD: 15.4 % — HIGH (ref 10.3–14.5)
SODIUM SERPL-SCNC: 142 MMOL/L — SIGNIFICANT CHANGE UP (ref 135–145)
WBC # BLD: 5.36 K/UL — SIGNIFICANT CHANGE UP (ref 3.8–10.5)
WBC # FLD AUTO: 5.36 K/UL — SIGNIFICANT CHANGE UP (ref 3.8–10.5)

## 2022-10-13 PROCEDURE — G0463: CPT

## 2022-10-13 RX ORDER — SODIUM CHLORIDE 9 MG/ML
3 INJECTION INTRAMUSCULAR; INTRAVENOUS; SUBCUTANEOUS EVERY 8 HOURS
Refills: 0 | Status: DISCONTINUED | OUTPATIENT
Start: 2022-10-20 | End: 2022-11-03

## 2022-10-13 NOTE — H&P PST ADULT - ASSESSMENT
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

## 2022-10-13 NOTE — H&P PST ADULT - REASON FOR ADMISSION
Exam under anesthesia colposcopy wide local excision of vulva possible cervical biopsy possible vaginal biopsy

## 2022-10-13 NOTE — H&P PST ADULT - PROBLEM SELECTOR PLAN 1
schedule for Exam under anesthesia colposcopy wide local excision of vulva possible cervical bx possible vaginal bx.      Pt instructed to be NPO the night and the morning of surgery. Provided with chlorhexidene 4% solution to wash for 3 days including the day of surgery. Given written instructions sheet. Escort required post procedure.    Stop Bang Score=0 Pt low risk for YANELY

## 2022-10-13 NOTE — H&P PST ADULT - NSICDXFAMILYHX_GEN_ALL_CORE_FT
FAMILY HISTORY:  Father  Still living? Yes, Estimated age: 69  FHx: prostate cancer, Age at diagnosis: Age Unknown    Mother  Still living? Yes, Estimated age: 60  FH: CML (chronic myeloid leukemia), Age at diagnosis: Age Unknown

## 2022-10-13 NOTE — H&P PST ADULT - ATTENDING COMMENTS
Patient seen in ASU with her mother present. No changes reported, consent reviewed including examination by learner. All questions answered to the patient and her mothers apparent satisfaction.     Pippa Bravo MD

## 2022-10-13 NOTE — H&P PST ADULT - FALL HARM RISK - UNIVERSAL INTERVENTIONS
Bed in lowest position, wheels locked, appropriate side rails in place/Call bell, personal items and telephone in reach/Instruct patient to call for assistance before getting out of bed or chair/Non-slip footwear when patient is out of bed/[place] to call system/Physically safe environment - no spills, clutter or unnecessary equipment/Purposeful Proactive Rounding/Room/bathroom lighting operational, light cord in reach

## 2022-10-13 NOTE — H&P PST ADULT - NSANTHOSAYNRD_GEN_A_CORE
No. YANELY screening performed.  STOP BANG Legend: 0-2 = LOW Risk; 3-4 = INTERMEDIATE Risk; 5-8 = HIGH Risk

## 2022-10-13 NOTE — H&P PST ADULT - HAVE YOU HAD A FIRST COVID-19 BOOSTER?
Yaneth Gavin [address] [facility] PHYSICAL THERAPY  [address] [facility] [place], [address] - Phone: [phone]  Fax: [phone]  DISCHARGE SUMMARY FOR PHYSICAL THERAPY          Patient Name: Neri Salazar : 1973   Treatment/Medical Diagnosis: Generalized muscle weakness [M62.81]  Stroke [facility]) [I63.9]   Onset Date: Chronic, CVA 2014      Referral Source: Karine Ramon [facility]): 2016   Prior Hospitalization: See Medical History Provider #: [id number]   Prior Level of Function: WC dependent since CVA in 2014; requires assistance with bed mobility   Comorbidities: HTN   Medications: Verified on Patient Summary List   Visits from [facility]: 3 Missed Visits: 0     Goal/Measure of Progress Goal Met? 1. Patient will require min A X 1 with rolling to R side to allow caregivers greater ease with ADL's. Status at last Eval: Mod/max A x 1 Current Status: NT no   2. Patient will require mod A X 1 with sit <> supine tranfers to improve independence with transfers and decrease load on caregiver. Status at last Eval: Max A x 1 Current Status: NT no   3. Patient will be independent with home exercise program.   Status at last Eval: Not initiated Current Status: Family reports pt performing LE AROM sitting in w/c progressing     Key Functional Changes/Progress: Patient progress has been limited by inability to transfer out of w/c in clinic due to concerns regarding safety of transferring without [facility] lift due to disintegration of skull flap reported by patient's family; have not received response from MD regarding whether patient can safely transfer out of w/c without [facility] lift.   Patient has performed gentle LE ROM/strengthening exercises in w/c.  LE strength is as follows:  L hip flex = 3-/5, R hip flex = 3/5  L hip abd/add = 2/5, R hip abd = 3-/5  B knee ext = 4/5  B knee flex = 4/5  L ankle DF = 3-/5, R ankle DF = 4/5  L ankle PF = 3/5, R ankle DF = 4-/5    G-Codes (GP): Mobility  [id number] Goal  CK= 40-59% [id number] D/C  CN= 100%. The severity rating is based on the Other Supine to sit transfer    Assessments/Recommendations: Other: Discontinue therapy. Scheduled for surgery 2/7/2017. Will need new prescription to resume PT after surgery as appropriate. If you have any questions/comments please contact us directly at [phone]. Thank you for allowing us to assist in the care of your patient. Therapist Signature: Viviana Mcconnell, PT Date: 2/2/2017   Reporting Period: 1/19/2017-2/2/2017 Time: 11:11 AM     NOTE TO PHYSICIAN:  PLEASE COMPLETE THE ORDERS BELOW AND FAX TO   [facility] Physical Therapy: [phone]. If you are unable to process this request in 24 hours please contact our office: [phone].    ___ I have read the above report and request that my patient be discharged from therapy.      Physician Signature:        Date:______Time: Yes

## 2022-10-14 ENCOUNTER — OUTPATIENT (OUTPATIENT)
Dept: OUTPATIENT SERVICES | Facility: HOSPITAL | Age: 27
LOS: 1 days | End: 2022-10-14

## 2022-10-14 DIAGNOSIS — N90.1 MODERATE VULVAR DYSPLASIA: ICD-10-CM

## 2022-10-14 DIAGNOSIS — Z78.9 OTHER SPECIFIED HEALTH STATUS: Chronic | ICD-10-CM

## 2022-10-15 PROBLEM — N90.3 DYSPLASIA OF VULVA, UNSPECIFIED: Chronic | Status: ACTIVE | Noted: 2022-10-13

## 2022-10-16 ENCOUNTER — NON-APPOINTMENT (OUTPATIENT)
Age: 27
End: 2022-10-16

## 2022-10-17 ENCOUNTER — RESULT REVIEW (OUTPATIENT)
Age: 27
End: 2022-10-17

## 2022-10-17 LAB — SURGICAL PATHOLOGY STUDY: SIGNIFICANT CHANGE UP

## 2022-10-19 ENCOUNTER — TRANSCRIPTION ENCOUNTER (OUTPATIENT)
Age: 27
End: 2022-10-19

## 2022-10-19 ENCOUNTER — NON-APPOINTMENT (OUTPATIENT)
Age: 27
End: 2022-10-19

## 2022-10-20 ENCOUNTER — RESULT REVIEW (OUTPATIENT)
Age: 27
End: 2022-10-20

## 2022-10-20 ENCOUNTER — OUTPATIENT (OUTPATIENT)
Dept: OUTPATIENT SERVICES | Facility: HOSPITAL | Age: 27
LOS: 1 days | End: 2022-10-20
Payer: COMMERCIAL

## 2022-10-20 ENCOUNTER — TRANSCRIPTION ENCOUNTER (OUTPATIENT)
Age: 27
End: 2022-10-20

## 2022-10-20 ENCOUNTER — APPOINTMENT (OUTPATIENT)
Dept: GYNECOLOGIC ONCOLOGY | Facility: HOSPITAL | Age: 27
End: 2022-10-20

## 2022-10-20 VITALS
OXYGEN SATURATION: 100 % | SYSTOLIC BLOOD PRESSURE: 126 MMHG | DIASTOLIC BLOOD PRESSURE: 85 MMHG | RESPIRATION RATE: 16 BRPM | HEART RATE: 67 BPM | TEMPERATURE: 98 F

## 2022-10-20 VITALS
HEIGHT: 65 IN | RESPIRATION RATE: 16 BRPM | SYSTOLIC BLOOD PRESSURE: 124 MMHG | WEIGHT: 179.9 LBS | TEMPERATURE: 99 F | DIASTOLIC BLOOD PRESSURE: 89 MMHG | OXYGEN SATURATION: 100 % | HEART RATE: 84 BPM

## 2022-10-20 DIAGNOSIS — Z78.9 OTHER SPECIFIED HEALTH STATUS: Chronic | ICD-10-CM

## 2022-10-20 DIAGNOSIS — Z01.812 ENCOUNTER FOR PREPROCEDURAL LABORATORY EXAMINATION: ICD-10-CM

## 2022-10-20 DIAGNOSIS — R87.619 UNSPECIFIED ABNORMAL CYTOLOGICAL FINDINGS IN SPECIMENS FROM CERVIX UTERI: ICD-10-CM

## 2022-10-20 LAB
BLD GP AB SCN SERPL QL: SIGNIFICANT CHANGE UP
HCG UR QL: NEGATIVE — SIGNIFICANT CHANGE UP

## 2022-10-20 PROCEDURE — 81025 URINE PREGNANCY TEST: CPT

## 2022-10-20 PROCEDURE — 88360 TUMOR IMMUNOHISTOCHEM/MANUAL: CPT

## 2022-10-20 PROCEDURE — 88341 IMHCHEM/IMCYTCHM EA ADD ANTB: CPT

## 2022-10-20 PROCEDURE — 86850 RBC ANTIBODY SCREEN: CPT

## 2022-10-20 PROCEDURE — 88341 IMHCHEM/IMCYTCHM EA ADD ANTB: CPT | Mod: 26,59

## 2022-10-20 PROCEDURE — 36415 COLL VENOUS BLD VENIPUNCTURE: CPT

## 2022-10-20 PROCEDURE — 56620 VULVECTOMY SIMPLE PARTIAL: CPT | Mod: 59

## 2022-10-20 PROCEDURE — 57500 BIOPSY OF CERVIX: CPT

## 2022-10-20 PROCEDURE — 88305 TISSUE EXAM BY PATHOLOGIST: CPT | Mod: 26

## 2022-10-20 PROCEDURE — 57454 BX/CURETT OF CERVIX W/SCOPE: CPT

## 2022-10-20 PROCEDURE — 88360 TUMOR IMMUNOHISTOCHEM/MANUAL: CPT | Mod: 26

## 2022-10-20 PROCEDURE — 88305 TISSUE EXAM BY PATHOLOGIST: CPT

## 2022-10-20 PROCEDURE — 86900 BLOOD TYPING SEROLOGIC ABO: CPT

## 2022-10-20 PROCEDURE — 12044 INTMD RPR N-HF/GENIT7.6-12.5: CPT | Mod: XS

## 2022-10-20 PROCEDURE — 88342 IMHCHEM/IMCYTCHM 1ST ANTB: CPT

## 2022-10-20 PROCEDURE — 57505 ENDOCERVICAL CURETTAGE: CPT

## 2022-10-20 PROCEDURE — 86901 BLOOD TYPING SEROLOGIC RH(D): CPT

## 2022-10-20 PROCEDURE — 11623 EXC S/N/H/F/G MAL+MRG 2.1-3: CPT

## 2022-10-20 PROCEDURE — 88342 IMHCHEM/IMCYTCHM 1ST ANTB: CPT | Mod: 26,59

## 2022-10-20 RX ORDER — ONDANSETRON 8 MG/1
8 TABLET, FILM COATED ORAL ONCE
Refills: 0 | Status: DISCONTINUED | OUTPATIENT
Start: 2022-10-20 | End: 2022-10-20

## 2022-10-20 RX ORDER — OXYCODONE HYDROCHLORIDE 5 MG/1
1 TABLET ORAL
Qty: 10 | Refills: 0
Start: 2022-10-20

## 2022-10-20 RX ORDER — SENNA PLUS 8.6 MG/1
2 TABLET ORAL
Qty: 20 | Refills: 0
Start: 2022-10-20 | End: 2022-10-29

## 2022-10-20 RX ORDER — FENTANYL CITRATE 50 UG/ML
25 INJECTION INTRAVENOUS
Refills: 0 | Status: DISCONTINUED | OUTPATIENT
Start: 2022-10-20 | End: 2022-10-20

## 2022-10-20 RX ORDER — ACETAMINOPHEN 500 MG
3 TABLET ORAL
Qty: 0 | Refills: 0 | DISCHARGE

## 2022-10-20 RX ORDER — CHOLECALCIFEROL (VITAMIN D3) 125 MCG
75 CAPSULE ORAL
Qty: 0 | Refills: 0 | DISCHARGE

## 2022-10-20 RX ORDER — DOCUSATE SODIUM 100 MG
1 CAPSULE ORAL
Qty: 10 | Refills: 0
Start: 2022-10-20 | End: 2022-10-29

## 2022-10-20 RX ORDER — HYDROMORPHONE HYDROCHLORIDE 2 MG/ML
1 INJECTION INTRAMUSCULAR; INTRAVENOUS; SUBCUTANEOUS
Refills: 0 | Status: DISCONTINUED | OUTPATIENT
Start: 2022-10-20 | End: 2022-10-20

## 2022-10-20 RX ORDER — IBUPROFEN 200 MG
1 TABLET ORAL
Qty: 0 | Refills: 0 | DISCHARGE

## 2022-10-20 NOTE — ASU PATIENT PROFILE, ADULT - FALL HARM RISK - UNIVERSAL INTERVENTIONS
Normally functioning per echo 11/2017.   Bed in lowest position, wheels locked, appropriate side rails in place/Call bell, personal items and telephone in reach/Instruct patient to call for assistance before getting out of bed or chair/Non-slip footwear when patient is out of bed/[place] to call system/Physically safe environment - no spills, clutter or unnecessary equipment/Purposeful Proactive Rounding/Room/bathroom lighting operational, light cord in reach

## 2022-10-20 NOTE — ASU DISCHARGE PLAN (ADULT/PEDIATRIC) - MEDICATION INSTRUCTIONS
Oxycodone 5mg every 6 hours as needed for severe pain has been sent to your pharmacy. Alternate using Motrin 600mg every 6 hours and Tylenol 975mg (2 extra strength or 3 regular tabs) every 6 hours for pain. Example pain schedule as follows: 9AM Tylenol 975mg, 12PM Motrin 600mg, 3PM Tylenol 975mg, 6PM Motrin 600mg, etc.

## 2022-10-20 NOTE — ASU DISCHARGE PLAN (ADULT/PEDIATRIC) - CALL YOUR DOCTOR IF YOU HAVE ANY OF THE FOLLOWING:
Bleeding that does not stop/Wound/Surgical Site with redness, or foul smelling discharge or pus/Unable to urinate Bleeding that does not stop/Fever greater than (need to indicate Fahrenheit or Celsius)/Wound/Surgical Site with redness, or foul smelling discharge or pus/Unable to urinate

## 2022-10-20 NOTE — BRIEF OPERATIVE NOTE - NSICDXBRIEFPREOP_GEN_ALL_CORE_FT
PRE-OP DIAGNOSIS:  Unspecified abnormal cytological findings in specimens from cervix uteri 20-Oct-2022 14:15:04  Tom Drake  Dysplasia of vulva, unspecified 20-Oct-2022 14:15:15  Tom Drake

## 2022-10-20 NOTE — ASU DISCHARGE PLAN (ADULT/PEDIATRIC) - CARE PROVIDER_API CALL
Pippa Garcia)  OBSGYN  Oncology  [address] [place] [address]  Phone: [phone]  Fax: [phone]  Established Patient  Follow Up Time: 2 weeks

## 2022-10-20 NOTE — BRIEF OPERATIVE NOTE - SPECIMENS
Endocervical curettings, 3o'clock and 6o'clock cervical biopsies, left superior labium majus lesion, left inferior labium majus lesion, and perianal biopsy

## 2022-10-20 NOTE — ASU DISCHARGE PLAN (ADULT/PEDIATRIC) - NS MD DC FALL RISK RISK
For information on Fall & Injury Prevention, visit: [email or web address] OR  [email or web address] OR  https://www.cdc.gov/steadi/patient.html

## 2022-10-20 NOTE — BRIEF OPERATIVE NOTE - OPERATION/FINDINGS
EUA revealed previously biopsied 5 mm lesion noted with aceto-white change on left labium majus along with 2mm additional aceto-white change noted directly below previously biopsied lesion. 1mm raised hyperkeratotic lesion noted on inferior portion of left labium majus. 8-10mm raised hyperkeratotic lesions noted in midline of perineum. 6 wk size anteverted uterus. Cervix with aceto-white changes noted at 3o'clock and 6o'clock positions. No adnexal fullness. Endocervical curettings, 3o'clock and 6o'clock cervical biopsies, left superior labium majus lesion, left inferior labium majus lesion, and perianal biopsy sent to pathology. Excellent hemostasis noted. EUA revealed previously biopsied 5 mm left labial lesion noted with aceto-white change on left labium majus along with 2mm additional aceto-white change noted directly below previously biopsied lesion. 1mm raised hyperkeratotic lesion noted on inferior portion of left labium majus. 8-10mm raised hyperkeratotic lesions noted in midline of perineum. 6 wk size anteverted uterus. Cervix with aceto-white changes noted at 3o'clock and 6o'clock positions. No adnexal fullness. Endocervical curettings, 3o'clock and 6o'clock cervical biopsies, left superior labium majus lesion, left inferior labium majus lesion, and perianal biopsy sent to pathology. Excellent hemostasis noted.

## 2022-10-20 NOTE — BRIEF OPERATIVE NOTE - NSICDXBRIEFPOSTOP_GEN_ALL_CORE_FT
POST-OP DIAGNOSIS:  Unspecified abnormal cytological findings in specimens from cervix uteri 20-Oct-2022 14:15:20  Tom Drake  Dysplasia of vulva, unspecified 20-Oct-2022 14:15:28  Tom Drake

## 2022-10-20 NOTE — BRIEF OPERATIVE NOTE - NSICDXBRIEFPROCEDURE_GEN_ALL_CORE_FT
PROCEDURES:  Exam under anesthesia, pelvic 20-Oct-2022 14:12:39  Tom Drake  Cervical biopsy 20-Oct-2022 14:12:58  Tom Drake  Wide local excision, vulva, with vulvar colposcopy 20-Oct-2022 14:13:40  Tom Drake  Perineal biopsy 20-Oct-2022 14:13:55  Tom Drake  D&C (dilatation and curettage, scraping of uterus) 20-Oct-2022 14:14:20  Tom Drake

## 2022-10-21 ENCOUNTER — NON-APPOINTMENT (OUTPATIENT)
Age: 27
End: 2022-10-21

## 2022-10-28 LAB — SURGICAL PATHOLOGY STUDY: SIGNIFICANT CHANGE UP

## 2022-11-02 ENCOUNTER — NON-APPOINTMENT (OUTPATIENT)
Age: 27
End: 2022-11-02

## 2022-11-04 ENCOUNTER — APPOINTMENT (OUTPATIENT)
Dept: GYNECOLOGIC ONCOLOGY | Facility: CLINIC | Age: 27
End: 2022-11-04

## 2022-11-22 ENCOUNTER — APPOINTMENT (OUTPATIENT)
Dept: GYNECOLOGIC ONCOLOGY | Facility: CLINIC | Age: 27
End: 2022-11-22

## 2022-11-22 VITALS
DIASTOLIC BLOOD PRESSURE: 80 MMHG | HEIGHT: 65 IN | WEIGHT: 180 LBS | TEMPERATURE: 97.2 F | BODY MASS INDEX: 29.99 KG/M2 | SYSTOLIC BLOOD PRESSURE: 128 MMHG

## 2022-11-22 PROCEDURE — 99497 ADVNCD CARE PLAN 30 MIN: CPT

## 2022-11-22 PROCEDURE — 99024 POSTOP FOLLOW-UP VISIT: CPT

## 2022-11-22 RX ORDER — DOCUSATE SODIUM 100 MG/1
100 CAPSULE, LIQUID FILLED ORAL
Qty: 10 | Refills: 0 | Status: ACTIVE | COMMUNITY
Start: 2022-10-20

## 2022-11-22 RX ORDER — SENNOSIDES 8.6 MG/1
8.6 TABLET, COATED ORAL
Qty: 20 | Refills: 0 | Status: ACTIVE | COMMUNITY
Start: 2022-10-20

## 2022-11-22 RX ORDER — OXYCODONE 5 MG/1
5 TABLET ORAL
Qty: 10 | Refills: 0 | Status: ACTIVE | COMMUNITY
Start: 2022-10-20

## 2023-01-07 ENCOUNTER — NON-APPOINTMENT (OUTPATIENT)
Age: 28
End: 2023-01-07

## 2023-01-14 PROBLEM — Z48.89 POSTOPERATIVE VISIT: Status: ACTIVE | Noted: 2023-01-14

## 2023-01-14 PROBLEM — Z48.89 POSTOPERATIVE VISIT: Status: RESOLVED | Noted: 2022-11-22 | Resolved: 2023-01-14

## 2023-01-17 ENCOUNTER — APPOINTMENT (OUTPATIENT)
Dept: GYNECOLOGIC ONCOLOGY | Facility: CLINIC | Age: 28
End: 2023-01-17
Payer: COMMERCIAL

## 2023-01-17 VITALS
DIASTOLIC BLOOD PRESSURE: 84 MMHG | BODY MASS INDEX: 29.99 KG/M2 | HEART RATE: 82 BPM | WEIGHT: 180 LBS | SYSTOLIC BLOOD PRESSURE: 133 MMHG | HEIGHT: 65 IN | TEMPERATURE: 97.4 F

## 2023-01-17 DIAGNOSIS — N89.8 OTHER SPECIFIED NONINFLAMMATORY DISORDERS OF VAGINA: ICD-10-CM

## 2023-01-17 DIAGNOSIS — Z48.89 ENCOUNTER FOR OTHER SPECIFIED SURGICAL AFTERCARE: ICD-10-CM

## 2023-01-17 PROCEDURE — 99024 POSTOP FOLLOW-UP VISIT: CPT

## 2023-01-19 ENCOUNTER — NON-APPOINTMENT (OUTPATIENT)
Age: 28
End: 2023-01-19

## 2023-01-19 LAB
CANDIDA VAG CYTO: NOT DETECTED
G VAGINALIS+PREV SP MTYP VAG QL MICRO: NOT DETECTED
T VAGINALIS VAG QL WET PREP: NOT DETECTED

## 2023-03-03 NOTE — H&P PST ADULT - FALL HARM RISK - PATIENT NEEDS ASSISTANCE
"Chief Complaint  Med Refill    Subjective        Leonila Hamm presents to [facility] PRIMARY [facility]  History of Present Illness she has not had bw done in about a year.     Objective   Vital Signs:  /94 (BP Location: Left arm, Patient Position: Sitting, Cuff Size: Adult)   Pulse 90   Temp 98.2 °F (36.8 °C) (Temporal)   Ht 177.8 cm (70\")   Wt 78 kg (171 lb 14.4 oz)   SpO2 96%   BMI 24.67 kg/m²   Estimated body mass index is 24.67 kg/m² as calculated from the following:    Height as of this encounter: 177.8 cm (70\").    Weight as of this encounter: 78 kg (171 lb 14.4 oz).       BMI is within normal parameters. No other follow-up for BMI required.      Physical Exam  Vitals and nursing note reviewed.   Constitutional:       Appearance: Normal appearance.   HENT:      Head: Normocephalic and atraumatic.      Right Ear: Tympanic membrane and ear canal normal.      Left Ear: Tympanic membrane and ear canal normal.      Nose: Nose normal.      Mouth/Throat:      Pharynx: Oropharynx is clear.   Eyes:      Extraocular Movements: Extraocular movements intact.      Conjunctiva/sclera: Conjunctivae normal.      Pupils: Pupils are equal, round, and reactive to light.   Cardiovascular:      Rate and Rhythm: Normal rate and regular rhythm.      Heart sounds: Normal heart sounds.   Pulmonary:      Effort: Pulmonary effort is normal.      Breath sounds: Normal breath sounds.   Abdominal:      General: Abdomen is flat. Bowel sounds are normal. There is no distension.      Palpations: Abdomen is soft.      Tenderness: There is no abdominal tenderness. There is no guarding or rebound.   Musculoskeletal:         General: Normal range of motion.      Cervical back: Normal range of motion and neck supple.   Skin:     General: Skin is warm and dry.   Neurological:      General: No focal deficit present.      Mental Status: She is alert and oriented to person, place, and time. Mental status is at baseline. "   Psychiatric:         Mood and Affect: Mood normal.         Behavior: Behavior normal.         Thought Content: Thought content normal.         Judgment: Judgment normal.        Result Review :      Data reviewed: I reviewed her bw from last year.              Assessment and Plan   Diagnoses and all orders for this visit:    1. Anxiety  -     busPIRone (BUSPAR) 10 MG tablet; Take 1 tablet by mouth 2 (Two) Times a Day.  Dispense: 180 tablet; Refill: 3  -     escitalopram (LEXAPRO) 20 MG tablet; Take 1 tablet by mouth Daily.  Dispense: 90 tablet; Refill: 3    2. Primary insomnia  -     escitalopram (LEXAPRO) 20 MG tablet; Take 1 tablet by mouth Daily.  Dispense: 90 tablet; Refill: 3  -     traZODone (DESYREL) 50 MG tablet; Take 1 tablet by mouth every night at bedtime.  Dispense: 90 tablet; Refill: 3    3. Other specified hypothyroidism  -     levothyroxine (SYNTHROID, LEVOTHROID) 88 MCG tablet; Take 1 tablet by mouth Daily.  Dispense: 90 tablet; Refill: 1             Follow Up   Return in about 6 months (around 9/3/2023) for Recheck.  Patient was given instructions and counseling regarding her condition or for health maintenance advice. Please see specific information pulled into the AVS if appropriate.       Answers for HPI/ROS submitted by the patient on 2/24/2023  Please describe your symptoms.: check-up  Have you had these symptoms before?: Yes  How long have you been having these symptoms?: Greater than 2 weeks  What is the primary reason for your visit?: Other       No assistance needed

## 2023-06-02 ENCOUNTER — APPOINTMENT (OUTPATIENT)
Dept: OTOLARYNGOLOGY | Facility: CLINIC | Age: 28
End: 2023-06-02
Payer: COMMERCIAL

## 2023-06-02 ENCOUNTER — NON-APPOINTMENT (OUTPATIENT)
Age: 28
End: 2023-06-02

## 2023-06-02 VITALS — WEIGHT: 180 LBS | BODY MASS INDEX: 29.99 KG/M2 | HEIGHT: 65 IN | TEMPERATURE: 97.3 F

## 2023-06-02 DIAGNOSIS — J34.89 OTHER SPECIFIED DISORDERS OF NOSE AND NASAL SINUSES: ICD-10-CM

## 2023-06-02 DIAGNOSIS — R09.81 NASAL CONGESTION: ICD-10-CM

## 2023-06-02 DIAGNOSIS — J34.2 DEVIATED NASAL SEPTUM: ICD-10-CM

## 2023-06-02 PROCEDURE — 99204 OFFICE O/P NEW MOD 45 MIN: CPT | Mod: 25

## 2023-06-02 PROCEDURE — 31231 NASAL ENDOSCOPY DX: CPT

## 2023-06-02 RX ORDER — LORATADINE 5 MG/5 ML
0.05 SOLUTION, ORAL ORAL
Qty: 1 | Refills: 0 | Status: ACTIVE | COMMUNITY
Start: 2023-06-02 | End: 1900-01-01

## 2023-06-02 RX ORDER — METHYLPREDNISOLONE 4 MG/1
4 TABLET ORAL
Qty: 1 | Refills: 0 | Status: ACTIVE | COMMUNITY
Start: 2023-06-02 | End: 1900-01-01

## 2023-06-02 NOTE — HISTORY OF PRESENT ILLNESS
[de-identified] : 27 year old female with hx of allergies and congestion presents for evaluation of obstructed breathing after being hit with a soccer ball in August, she does not know if she broke her nose. States that she feels her nose is crooked. States she is stuffy since took soccer ball to nose. Has seasonal allergies March-May every year and takes allegra which usually helps. Sometimes breaths from mouth. Sometimes feels like has to move her nose at work to breath better, it clicks. Has sinus pressure between her eyes. Denies hx of sinus infections. Denies using nasal sprays recently, did try flonase once years ago but it didn’t help. \par + hx allergy testing and reports being allergic to pollen and trees.

## 2023-06-02 NOTE — CONSULT LETTER
[Please see my note below.] : Please see my note below. [FreeTextEntry1] : Dear Dr. JENNIFER ADAMES \par I had the pleasure of evaluating your patient DEYA BAILEY, thank you for allowing us to participate in their care. please see full note detailing our visit below.\par If you have any questions, please do not hesitate to call me and I would be happy to discuss further. \par \par Kamron Ordaz M.D.\par Attending Physician,  \par Department of Otolaryngology - Head and Neck Surgery\par [facility] \par Office: [phone]\par Fax: [phone]\par

## 2023-06-02 NOTE — ASSESSMENT
[FreeTextEntry1] : 28 y/o F pt with hx of nasal trauma presents for evaluation of nasal obstruction, nasal congestion and sinus pressure. \par - We will proceed with a regiment of decongestants, antibiotics and irrigation to try to break the cycle of inflammation and obstruction. this will treat the acute symptoms and will hopefully allow for maintenance therapy to reach disease areas and avoid further intervention.\par - continue Flonase\par - continue nasal washes\par - will see if sinusitis regimen helps with pressure and congestion, if sx persist can consider CT scan to further assess sinuses for possible in office sinus procedure \par \par follow up in 1 month\par \par

## 2023-06-02 NOTE — PROCEDURE
[FreeTextEntry6] : Procedure performed: Nasal Endoscopy- Diagnostic\par Pre-op indication(s): nasal congestion\par Post-op indication(s): nasal congestion\par Verbal and/or written consent obtained from patient\par Anterior rhinoscopy insufficient to account for symptoms\par Scope #: 3, flexible fiber optic telescope\par The scope was introduced in the nasal passage between the middle and inferior turbinates to exam the inferior portion of the middle meatus and the fontanelle, as well as the maxillary ostia. Next, the scope was passed medically and posteriorly to the middle turbinates to examine the sphenoethmoid recess and the superior turbinate region.\par \par Upon visualization the finders are as follows:\par Nasal Septum: L DNS\par Bilateral - Mucosa: boggy turbinates, Mucous: scant, Polyp: not seen, Inferior Turbinate: boggy, Middle Turbinate: normal, Superior Turbinate: normal, Inferior Meatus: narrow, Middle Meatus: narrow, Super Meatus: normal, Sphenoethmoidal Recess: clear\par

## 2023-06-02 NOTE — END OF VISIT
[FreeTextEntry3] : I personally saw and examined the patient in detail. I spoke to YOKO Hernandes regarding the assessment and plan of care.  I preformed the procedures and I reviewed the above assessment and plan of care, and agree. I have made changes in changes in the body of the note where appropriate.

## 2023-06-02 NOTE — REVIEW OF SYSTEMS
[Seasonal Allergies] : seasonal allergies [Nasal Congestion] : nasal congestion [Negative] : Heme/Lymph [de-identified] : can only breath through one nostril [FreeTextEntry1] : last summer was hit on nose with a soccer ball

## 2023-06-05 RX ORDER — AMOXICILLIN AND CLAVULANATE POTASSIUM 875; 125 MG/1; MG/1
875-125 TABLET, COATED ORAL TWICE DAILY
Qty: 20 | Refills: 0 | Status: ACTIVE | COMMUNITY
Start: 2023-06-02 | End: 1900-01-01

## 2023-06-19 NOTE — ED PROVIDER NOTE - ATTENDING CONTRIBUTION TO CARE
Patient informed.
Patient with burciaga 1 fracture of tooth after being struck in the head by a soccer ball at [facility]. No loss of consciousness. No bleeding. Mild tooth pain.  the 23rd? tooth, no pulp showing, tooth stable, alert, cooperative, ncat, perrl, CN 2-12 intact, normal coordination, trachea midline, ctab, non-tachycardic, non-tachypneic  will get dental consult for possible filling and or filling down of sharp edges of tooth, will discharge home to dental full  follow up   No immediate life threatening issues present on history or clinical exam. Patient is a safe disposition home, has capacity and insight into their condition, ambulatory in the emergency department and will follow up with their doctor(s) this week. Patient and family  understand anticipatory guidance were given strict return and follow up precautions.  The patient and family have been informed of all concerning signs and symptoms to return to Emergency Department, the necessity to follow up with PMD/Clinic/follow up provided within 2-3 days was explained, and the patient and/or family reports understanding of above with capacity and insight.

## 2023-07-18 ENCOUNTER — APPOINTMENT (OUTPATIENT)
Dept: OTOLARYNGOLOGY | Facility: CLINIC | Age: 28
End: 2023-07-18

## 2023-08-01 ENCOUNTER — APPOINTMENT (OUTPATIENT)
Dept: GYNECOLOGIC ONCOLOGY | Facility: CLINIC | Age: 28
End: 2023-08-01
Payer: COMMERCIAL

## 2023-08-01 VITALS
SYSTOLIC BLOOD PRESSURE: 129 MMHG | HEIGHT: 65 IN | BODY MASS INDEX: 30.99 KG/M2 | WEIGHT: 186 LBS | DIASTOLIC BLOOD PRESSURE: 84 MMHG | HEART RATE: 88 BPM

## 2023-08-01 PROCEDURE — 99213 OFFICE O/P EST LOW 20 MIN: CPT

## 2023-08-02 NOTE — PHYSICAL EXAM
[Chaperone Present] : A chaperone was present in the examining room during all aspects of the physical examination [Normal] : Recto-Vaginal Exam: Normal [Fully active, able to carry on all pre-disease performance without restriction] : Status 0 - Fully active, able to carry on all pre-disease performance without restriction [FreeTextEntry1] : Milena WONG [de-identified] : no lesions, bumps or excoriations noted. Well healed excision sites.

## 2023-08-02 NOTE — ASSESSMENT
[FreeTextEntry1] : 27 yo with history of office biopsy proven VIN3 s/p EUA, colposcopy, wide local excision, excision of additional vulvar lesions, cervical biopsies, and ECC. Here for her surveillance visit.  -Doing well, no evidence of recurrent lesions or dysplasia. - Again we discussed final pathology from her prior procedure in detail including natural course of VIN3 and need for long-term surveillance of the entire genital tract, given the possibility of late recurrences. Recommended follow-up with a gynecologic examination (including visual inspection of the vulva) every six months for five years and then annually. Further evaluation with colposcopy and biopsies is warranted if the patient exhibits symptoms and/or examination findings concerning for additional disease. -Plan for patient to follow up in 6 months. -Discussed HPV vaccination which she notes she has completed as an adolescent. - I spent the time noted on the day of this patient encounter preparing for, providing and documenting the above service. I have counseled and educated the patient on the differential, workup, disease course, and treatment/management plan. Education was provided to the patient during this encounter. All questions and concerns were answered and addressed in detail.

## 2023-08-02 NOTE — HISTORY OF PRESENT ILLNESS
[FreeTextEntry1] : GYN/Ref:  Dr. Renae PCP:  Dr. Jessica Gomes  Ms. Hung, 28 years old, s/p EUA, cervical biopsies, wide local excision of vulvar lesion; left vulvar lesion biopsy; perineal and vulvar lesion excision on 10/20/22.    Final path:   1. Cervix at 3:00; biopsy: - Squamous mucosa, negative for dysplasia. 2. Cervix at 6:00; biopsy: - Fragments of squamous mucosa, negative for dysplasia. 3. Endocervix; curettings: - Scant squamous and glandular epithelial cells in a mucinous background, negative for dysplasia. 4. Left labia majus; biopsy: - Squamous mucosa, negative for dysplasia. See comment. 5. 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 See comment. - All margins are free of lesional tissue. 6. Left small inferior labia; biopsy: - Vulvar intraepithelial lesion, high grade (TAO 3/carcinoma in situ), completely excised.  She presents today for 6 month follow up.  No complaints to report. She denies fevers, chills, night sweats, chest pain, SOB, changes in appetite, nausea, vomiting, increased abdominal girth, unintentional weight loss, abdominopelvic pain, lower extremity edema or pain and changes in bowel/bladder movements. Denies vaginal bleeding and abnormal vaginal discharge. Denies any vulvar lesions or irritation. She recently bought a new home and is excited about this.  POB: nulligravida GYN hx: LGT dysplasia, denies c/f, denies abnl paps previously. Was on OCPs but would like a break from hormonal BCMs at the moment. PMHx: constipation Meds: None Allergies: NKDA PSHx: Colonoscopy 2015 Social Hx: non smoker, rare ETOH, no drugs Family Hx of cancer: father with prostate cancer, mother with leukemia  HCM Pap (9/2021)- ASCUS, HPV 16 (+)

## 2023-08-02 NOTE — DISCUSSION/SUMMARY
[Reviewed Clinical Lab Test(s)] : Results of clinical tests were reviewed. [Reviewed Radiology Report(s)] : Radiology reports were reviewed. [Reviewed Radiology Film/Image(s)] : Images from radiology studies were reviewed and examined. [Visit Time ___ Minutes] : [unfilled] minutes [Face to Face Time___ Minutes] : with [unfilled] minutes in face to face consultation.

## 2023-11-04 ENCOUNTER — EMERGENCY (EMERGENCY)
Facility: HOSPITAL | Age: 28
LOS: 1 days | Discharge: HOME | End: 2023-11-04
Attending: EMERGENCY MEDICINE
Payer: COMMERCIAL

## 2023-11-04 VITALS
HEART RATE: 79 BPM | TEMPERATURE: 99 F | DIASTOLIC BLOOD PRESSURE: 92 MMHG | WEIGHT: 179.9 LBS | SYSTOLIC BLOOD PRESSURE: 133 MMHG | HEIGHT: 65 IN | OXYGEN SATURATION: 100 % | RESPIRATION RATE: 20 BRPM

## 2023-11-04 DIAGNOSIS — Z78.9 OTHER SPECIFIED HEALTH STATUS: Chronic | ICD-10-CM

## 2023-11-04 PROCEDURE — 99283 EMERGENCY DEPT VISIT LOW MDM: CPT

## 2023-11-04 PROCEDURE — 99282 EMERGENCY DEPT VISIT SF MDM: CPT

## 2023-11-04 RX ORDER — ACETAMINOPHEN 500 MG
975 TABLET ORAL ONCE
Refills: 0 | Status: COMPLETED | OUTPATIENT
Start: 2023-11-04 | End: 2023-11-04

## 2023-11-04 NOTE — ED PROVIDER NOTE - NSFOLLOWUPINSTRUCTIONS_ED_ALL_ED_FT
You were seen today in the emergency room for abdominal pain. Although the testing done today indicates that your pain is not from an acute emergency, your pain could still represent a more serious problem. Most commonly, the pain you are having is likely not something serious and will resolve in a few days, however testing was done today based on the symptoms that you currently have; so if you develop new or worsening symptoms this could be a sign of a problem that was not tested for and means you should come back to the emergency room or see your doctor urgently. You need to follow up with your doctor in the next 48-72 hours. If you develop any new or worsening symptoms you need to return immediately to the emergency department. If you experience any of the following please come right back to the emergency room: severe nausea and vomiting with inability to tolerate eating, severe worsening of your pain, a new fever, new bleeding in stool or vomit, confusion, new numbness or weakness, passing out.    To control your pain at home, you should take Ibuprofen 400 mg along with Tylenol 650mg-1000mg every 6 to 8 hours. Limit your maximum daily Tylenol from all sources to 4000mg. Be aware that many other medications contain acetaminophen which is also known as Tylenol. Taking Tylenol and Ibuprofen together has been shown to be more effective at relieving pain than taking them separately. These are both over the counter medications that you can  at your local pharmacy without a prescription. You need to respect all of the warnings on the bottles. You shouldn’t take these medications for more than a week without following up with your doctor. Both medications come with certain risks and side effects that you need to discuss with your doctor, especially if you are taking them for a prolonged period.

## 2023-11-04 NOTE — ED PROVIDER NOTE - PHYSICAL EXAMINATION
Issac Garber DO (PGY2)   Physical Exam:    Gen: NAD, AOx3  Head: NCAT  HEENT: EOMI, PEERLA  Lung: CTAB, no respiratory distress, no wheezes/rhonchi/rales B/L  CV: RRR, no murmurs, rubs or gallops  Abd: soft, NT, ND, no guarding, no rigidity, no rebound tenderness, no CVA tenderness   MSK: no visible deformities, ROM normal in UE/LE, no back pain  Neuro: No focal sensory or motor deficits. Sensation intact to light touch all extremities.  Skin: Warm, well perfused, no rash, no leg swelling  Psych: normal affect, calm Issac Garber DO (PGY2)   Physical Exam:    Gen: NAD, AOx3  Head: NCAT  HEENT: EOMI, PEERLA  Lung: CTAB, no respiratory distress, no wheezes/rhonchi/rales B/L  CV: RRR, no murmurs, rubs or gallops  Abd: soft, NT, ND, no guarding, no rigidity, no rebound tenderness, no CVA tenderness, no skin bruising  MSK: no visible deformities, ROM normal in UE/LE, no back pain, normal unassisted gait  Neuro: No focal sensory or motor deficits. Sensation intact to light touch all extremities.  Skin: Warm, well perfused, no rash, no leg swelling  Psych: normal affect, calm
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
## 2023-11-04 NOTE — ED PROVIDER NOTE - PATIENT PORTAL LINK FT
You can access the FollowMyHealth Patient Portal offered by [facility] by registering at the following website: [email or web address]. By joining [employer]’s FollowMyHealth portal, you will also be able to view your health information using other applications (apps) compatible with our system.
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
## 2023-11-04 NOTE — ED ADULT NURSE NOTE - NSFALLUNIVINTERV_ED_ALL_ED
Bed/Stretcher in lowest position, wheels locked, appropriate side rails in place/Call bell, personal items and telephone in reach/Instruct patient to call for assistance before getting out of bed/chair/stretcher/Non-slip footwear applied when patient is off stretcher/[place] to call system/Physically safe environment - no spills, clutter or unnecessary equipment/Purposeful proactive rounding/Room/bathroom lighting operational, light cord in reach

## 2024-01-03 NOTE — ED ADULT NURSE NOTE - BREATHING, MLM
Want to Say “Thank You” to a Nurse?  The MONI Award® was created in memory of DOV Skelton by his family to say thank you to bedside nurses who provide an outstanding level of care.    Submit a nomination using any method below.     OR    [email or web address]  Or visit the Resource section   on your [employer] rain      Spontaneous, unlabored and symmetrical

## 2024-01-28 NOTE — HISTORY OF PRESENT ILLNESS
Mother
[FreeTextEntry1] : Parvez is a 26 year old female here for consultation.\par \par Colonoscopy 6/12/15 by Dr. Aviles done for blood in stool, constipation, RLQ and LLQ abdominal pain- thickening fold in sigmoid colon, ileum mucosa, cobblestone shape.\par \par MRI pelvis 3/19/18- patient was unable to defecate on the table limiting evaluation. minimal posterior compartment descent with associated small anterior rectocele. \par \par Pt saw Dr. Grover Parra 7/27/21. \par \par Today pt reports feeling well, no pain. Pt reports RBL in 2016 with no relief. Pelvic floor physical therapy 2017 and 2019 with no relief. Pt reports at age 18 age started to have trouble controlling gas. Pt reports 1-2 formed BMs daily, no straining, no pain, occasional BRB on toilet paper (1-2 month). Pt reports when she is moving her bowels she feels like it needs to pass by swollen tissue making it hard to completely evacuate. No incontinence of stool. Pt reports good appetite, no nausea, no vomiting. \par \par Currently, patient's primary complaint is difficulty holding gas.  She has tried managing the gas with Gas-X and charcoal tablets, as well as eliminating gas producing foods from her diet.  She is able to move her bowels successfully now.  Occasionally she has to splint but can still move her bowels.

## 2024-02-01 PROBLEM — D06.9 CARCINOMA IN SITU OF CERVIX, UNSPECIFIED LOCATION: Status: ACTIVE | Noted: 2024-02-01

## 2024-02-02 ENCOUNTER — APPOINTMENT (OUTPATIENT)
Dept: GYNECOLOGIC ONCOLOGY | Facility: CLINIC | Age: 29
End: 2024-02-02
Payer: COMMERCIAL

## 2024-02-02 VITALS
WEIGHT: 186 LBS | OXYGEN SATURATION: 97 % | HEIGHT: 65 IN | SYSTOLIC BLOOD PRESSURE: 133 MMHG | DIASTOLIC BLOOD PRESSURE: 83 MMHG | HEART RATE: 96 BPM | BODY MASS INDEX: 30.99 KG/M2

## 2024-02-02 DIAGNOSIS — R10.2 PELVIC AND PERINEAL PAIN: ICD-10-CM

## 2024-02-02 DIAGNOSIS — D06.9 CARCINOMA IN SITU OF CERVIX, UNSPECIFIED: ICD-10-CM

## 2024-02-02 PROCEDURE — 99213 OFFICE O/P EST LOW 20 MIN: CPT | Mod: 25

## 2024-02-02 PROCEDURE — 56821 COLPOSCOPY VULVA W/BIOPSY: CPT

## 2024-02-02 NOTE — PHYSICAL EXAM
[Chaperone Present] : A chaperone was present in the examining room during all aspects of the physical examination [Normal] : Recto-Vaginal Exam: Normal [Fully active, able to carry on all pre-disease performance without restriction] : Status 0 - Fully active, able to carry on all pre-disease performance without restriction [FreeTextEntry1] : Magy WONG [de-identified] : see colpo. Well healed excision sites. [de-identified] : RLQ TTP upon deep palpation

## 2024-02-02 NOTE — DISCUSSION/SUMMARY
[Reviewed Clinical Lab Test(s)] : Results of clinical tests were reviewed. [Reviewed Radiology Report(s)] : Radiology reports were reviewed. [Reviewed Radiology Film/Image(s)] : Images from radiology studies were reviewed and examined. [Visit Time ___ Minutes] : [unfilled] minutes [Face to Face Time___ Minutes] : with [unfilled] minutes in face to face consultation. [FreeTextEntry1] : - Patients history and work up to date reviewed in detail. - Doing well except with complatin of pelvic discomfort. TVUS ordered and to have vteb visit in the next few weeks to review. - Examination revealed the above noted lesion for which  biopsy was performed after risk benefits and alternatives to such procedure were explained to the patient and consent was obtained. Patient tolerated the procedure well and standard post procedure precautions provided. - Plan for close interval follow up  in the next couple weeks to review her pathology results as well as her imaging results and discuss management plan thereafter.  Signs and symptoms of when to seek evaluation sooner discussed with the patient. - I spent the time noted on the day of this patient encounter preparing for, providing and documenting the above service. I have counseled and educated the patient on the differential, workup, disease course, and treatment/management plan. Education was provided to the patient during this encounter. All questions and concerns were answered and addressed in detail.
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
## 2024-02-02 NOTE — PROCEDURE
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

## 2024-02-09 ENCOUNTER — APPOINTMENT (OUTPATIENT)
Dept: ULTRASOUND IMAGING | Facility: CLINIC | Age: 29
End: 2024-02-09

## 2024-02-13 LAB — CORE LAB BIOPSY: NORMAL

## 2024-02-16 ENCOUNTER — APPOINTMENT (OUTPATIENT)
Dept: ULTRASOUND IMAGING | Facility: CLINIC | Age: 29
End: 2024-02-16
Payer: COMMERCIAL

## 2024-02-16 PROCEDURE — 76856 US EXAM PELVIC COMPLETE: CPT | Mod: 59

## 2024-02-16 PROCEDURE — 76830 TRANSVAGINAL US NON-OB: CPT

## 2024-02-19 PROBLEM — D07.1 VULVAR INTRAEPITHELIAL NEOPLASIA (VIN) GRADE 3: Status: ACTIVE | Noted: 2022-11-23

## 2024-02-23 ENCOUNTER — APPOINTMENT (OUTPATIENT)
Dept: GYNECOLOGIC ONCOLOGY | Facility: CLINIC | Age: 29
End: 2024-02-23
Payer: COMMERCIAL

## 2024-02-23 DIAGNOSIS — D07.1 CARCINOMA IN SITU OF VULVA: ICD-10-CM

## 2024-02-23 PROCEDURE — 99212 OFFICE O/P EST SF 10 MIN: CPT
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
## 2024-02-24 NOTE — ASSESSMENT
[FreeTextEntry1] : 29 yo with history of office biopsy proven VIN3 s/p EUA, colposcopy, wide local excision, excision of additional vulvar lesions, cervical biopsies, and ECC. Here for her follow up visit.
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
## 2024-06-04 ENCOUNTER — NON-APPOINTMENT (OUTPATIENT)
Age: 29
End: 2024-06-04

## 2024-12-24 ENCOUNTER — APPOINTMENT (OUTPATIENT)
Dept: GYNECOLOGIC ONCOLOGY | Facility: CLINIC | Age: 29
End: 2024-12-24
Payer: COMMERCIAL

## 2024-12-24 VITALS
DIASTOLIC BLOOD PRESSURE: 84 MMHG | WEIGHT: 180 LBS | OXYGEN SATURATION: 100 % | SYSTOLIC BLOOD PRESSURE: 128 MMHG | BODY MASS INDEX: 29.99 KG/M2 | TEMPERATURE: 98.3 F | HEIGHT: 65 IN | HEART RATE: 99 BPM

## 2024-12-24 DIAGNOSIS — Z00.00 ENCOUNTER FOR GENERAL ADULT MEDICAL EXAMINATION W/OUT ABNORMAL FINDINGS: ICD-10-CM

## 2024-12-24 PROCEDURE — 99459 PELVIC EXAMINATION: CPT

## 2024-12-24 PROCEDURE — 99214 OFFICE O/P EST MOD 30 MIN: CPT

## 2024-12-27 LAB
C TRACH RRNA SPEC QL NAA+PROBE: NOT DETECTED
CANDIDA VAG CYTO: NOT DETECTED
G VAGINALIS+PREV SP MTYP VAG QL MICRO: NOT DETECTED
N GONORRHOEA RRNA SPEC QL NAA+PROBE: NOT DETECTED
SOURCE AMPLIFICATION: NORMAL
T VAGINALIS VAG QL WET PREP: NOT DETECTED

## 2024-12-30 LAB — HPV HIGH+LOW RISK DNA PNL CVX: NOT DETECTED

## 2025-01-03 LAB — CYTOLOGY CVX/VAG DOC THIN PREP: NORMAL

## 2025-02-26 ENCOUNTER — NON-APPOINTMENT (OUTPATIENT)
Age: 30
End: 2025-02-26

## 2025-03-05 ENCOUNTER — APPOINTMENT (OUTPATIENT)
Dept: UROGYNECOLOGY | Facility: CLINIC | Age: 30
End: 2025-03-05
Payer: COMMERCIAL

## 2025-03-05 DIAGNOSIS — R39.15 URGENCY OF URINATION: ICD-10-CM

## 2025-03-05 DIAGNOSIS — N89.8 OTHER SPECIFIED NONINFLAMMATORY DISORDERS OF VAGINA: ICD-10-CM

## 2025-03-05 DIAGNOSIS — R10.2 PELVIC AND PERINEAL PAIN: ICD-10-CM

## 2025-03-05 DIAGNOSIS — N39.41 URGE INCONTINENCE: ICD-10-CM

## 2025-03-05 LAB
BILIRUB UR QL STRIP: NORMAL
CLARITY UR: CLEAR
COLLECTION METHOD: NORMAL
GLUCOSE UR-MCNC: NORMAL
HCG UR QL: 0.2 EU/DL
HGB UR QL STRIP.AUTO: NORMAL
KETONES UR-MCNC: NORMAL
LEUKOCYTE ESTERASE UR QL STRIP: NORMAL
NITRITE UR QL STRIP: NORMAL
PH UR STRIP: 5
PROT UR STRIP-MCNC: NORMAL
SP GR UR STRIP: 1.03

## 2025-03-05 PROCEDURE — 81003 URINALYSIS AUTO W/O SCOPE: CPT | Mod: QW

## 2025-03-05 PROCEDURE — 51701 INSERT BLADDER CATHETER: CPT

## 2025-03-05 PROCEDURE — 99214 OFFICE O/P EST MOD 30 MIN: CPT | Mod: 25

## 2025-03-05 PROCEDURE — 99459 PELVIC EXAMINATION: CPT | Mod: NC

## 2025-03-05 RX ORDER — MIRABEGRON 50 MG/1
50 TABLET, EXTENDED RELEASE ORAL
Qty: 30 | Refills: 5 | Status: ACTIVE | COMMUNITY
Start: 2025-03-05 | End: 1900-01-01

## 2025-03-10 DIAGNOSIS — Z78.9 OTHER SPECIFIED HEALTH STATUS: ICD-10-CM

## 2025-03-10 RX ORDER — FERROUS GLUCONATE 256(28)MG
325 TABLET ORAL
Refills: 0 | Status: ACTIVE | COMMUNITY

## 2025-04-16 ENCOUNTER — APPOINTMENT (OUTPATIENT)
Dept: COLORECTAL SURGERY | Facility: CLINIC | Age: 30
End: 2025-04-16
Payer: COMMERCIAL

## 2025-04-16 VITALS
SYSTOLIC BLOOD PRESSURE: 124 MMHG | OXYGEN SATURATION: 100 % | HEIGHT: 65 IN | BODY MASS INDEX: 29.99 KG/M2 | RESPIRATION RATE: 16 BRPM | WEIGHT: 180 LBS | TEMPERATURE: 98.7 F | DIASTOLIC BLOOD PRESSURE: 88 MMHG | HEART RATE: 78 BPM

## 2025-04-16 DIAGNOSIS — K62.89 OTHER SPECIFIED DISEASES OF ANUS AND RECTUM: ICD-10-CM

## 2025-04-16 DIAGNOSIS — R10.2 PELVIC AND PERINEAL PAIN: ICD-10-CM

## 2025-04-16 DIAGNOSIS — R14.3 FLATULENCE: ICD-10-CM

## 2025-04-16 DIAGNOSIS — K59.00 CONSTIPATION, UNSPECIFIED: ICD-10-CM

## 2025-04-16 PROCEDURE — 99204 OFFICE O/P NEW MOD 45 MIN: CPT

## 2025-04-16 RX ORDER — DIAZEPAM 5 MG/1
5 TABLET ORAL
Qty: 30 | Refills: 0 | Status: ACTIVE | COMMUNITY
Start: 2025-04-16 | End: 1900-01-01

## 2025-04-29 ENCOUNTER — OUTPATIENT (OUTPATIENT)
Dept: OUTPATIENT SERVICES | Facility: HOSPITAL | Age: 30
LOS: 1 days | End: 2025-04-29
Payer: COMMERCIAL

## 2025-04-29 VITALS
SYSTOLIC BLOOD PRESSURE: 128 MMHG | TEMPERATURE: 99 F | WEIGHT: 177.91 LBS | DIASTOLIC BLOOD PRESSURE: 83 MMHG | OXYGEN SATURATION: 99 % | HEIGHT: 64 IN | HEART RATE: 68 BPM | RESPIRATION RATE: 20 BRPM

## 2025-04-29 DIAGNOSIS — Z98.890 OTHER SPECIFIED POSTPROCEDURAL STATES: Chronic | ICD-10-CM

## 2025-04-29 DIAGNOSIS — K62.89 OTHER SPECIFIED DISEASES OF ANUS AND RECTUM: ICD-10-CM

## 2025-04-29 DIAGNOSIS — Z91.040 LATEX ALLERGY STATUS: ICD-10-CM

## 2025-04-29 DIAGNOSIS — Z78.9 OTHER SPECIFIED HEALTH STATUS: Chronic | ICD-10-CM

## 2025-04-29 LAB
HCT VFR BLD CALC: 31.7 % — LOW (ref 34.5–45)
HGB BLD-MCNC: 9.8 G/DL — LOW (ref 11.5–15.5)
MCHC RBC-ENTMCNC: 22.7 PG — LOW (ref 27–34)
MCHC RBC-ENTMCNC: 30.9 G/DL — LOW (ref 32–36)
MCV RBC AUTO: 73.4 FL — LOW (ref 80–100)
NRBC BLD AUTO-RTO: 0 /100 WBCS — SIGNIFICANT CHANGE UP (ref 0–0)
PLATELET # BLD AUTO: 320 K/UL — SIGNIFICANT CHANGE UP (ref 150–400)
RBC # BLD: 4.32 M/UL — SIGNIFICANT CHANGE UP (ref 3.8–5.2)
RBC # FLD: 17.6 % — HIGH (ref 10.3–14.5)
WBC # BLD: 7.18 K/UL — SIGNIFICANT CHANGE UP (ref 3.8–10.5)
WBC # FLD AUTO: 7.18 K/UL — SIGNIFICANT CHANGE UP (ref 3.8–10.5)

## 2025-04-29 PROCEDURE — G0463: CPT

## 2025-04-29 PROCEDURE — 85027 COMPLETE CBC AUTOMATED: CPT

## 2025-04-29 RX ORDER — SODIUM CHLORIDE 9 G/1000ML
1000 INJECTION, SOLUTION INTRAVENOUS
Refills: 0 | Status: DISCONTINUED | OUTPATIENT
Start: 2025-05-20 | End: 2025-06-03

## 2025-04-29 NOTE — H&P PST ADULT - ASSESSMENT
DASI score:  DASI activity:  Loose teeth or dentures:  Airway:  DASI score: >9.0 METs  DASI activity: Active, goes to gym few times a week, does cardio and weights, house work, climbs stairs without CP or SOB  Loose teeth or dentures: denies   Airway:  MP 1

## 2025-04-29 NOTE — H&P PST ADULT - NSICDXPASTSURGICALHX_GEN_ALL_CORE_FT
PAST SURGICAL HISTORY:  Known health problems: none      PAST SURGICAL HISTORY:  History of excision of mass     S/P colonoscopy

## 2025-04-29 NOTE — H&P PST ADULT - NSICDXPASTMEDICALHX_GEN_ALL_CORE_FT
PAST MEDICAL HISTORY:  Dysplasia of vulva, unspecified      PAST MEDICAL HISTORY:  Dysplasia of vulva, unspecified     Latex allergy     Rectal pain

## 2025-04-29 NOTE — H&P PST ADULT - PROBLEM SELECTOR PLAN 1
Plan for exam under anesthesia on 5/20/25 with Dr. Hess.   PST labs sent  (CBC)  UCG DOS, specimen cup provided   Pre surgical instructions discussed  Pre-op education provided - all questions answered

## 2025-04-29 NOTE — H&P PST ADULT - REASON FOR ADMISSION
Exam under anesthesia colposcopy wide local excision of vulva possible cervical biopsy possible vaginal biopsy "Exam under anesthesia for possible fissure or fistula"

## 2025-04-29 NOTE — H&P PST ADULT - HISTORY OF PRESENT ILLNESS
29 yr old female with history of HPV with mole on her vulva - bx  9/30/2022 results CIN2-3 s/p wide local excision of vulva Oct. 2022.  Pt with pelvic/rectal spasms and pain, flatulence and brown discharge through her vagina. She reports her menstrual periods are extremely severe. She has attempted donut pessary in 2020- little to no relief. She has also been experiencing urge incontinence, possible d/t overactive bladder.  She underwent a manometry in 2023- showed decreased anterior pressures, decrease in external anal sphincter, and possible dyssynergia.  Plan for exam under anesthesia on 5/20/25 with Dr. Hess.   ? 29 yr old female with history of HPV with mole on her vulva - bx  9/30/2022 results CIN2-3 s/p wide local excision of vulva Oct. 2022.  Pt with pelvic/rectal spasms and shooting pain, flatulence and brown discharge through her vagina. She reports her menstrual periods are extremely severe. She has attempted donut pessary in 2020- little to no relief. She has also been experiencing urge incontinence, possible d/t overactive bladder.  She underwent a manometry in 2023- showed decreased anterior pressures, decrease in external anal sphincter, and possible dyssynergia.  Plan for exam under anesthesia on 5/20/25 with Dr. Hess.   ?

## 2025-05-20 ENCOUNTER — APPOINTMENT (OUTPATIENT)
Dept: COLORECTAL SURGERY | Facility: HOSPITAL | Age: 30
End: 2025-05-20
Payer: COMMERCIAL

## 2025-05-20 ENCOUNTER — TRANSCRIPTION ENCOUNTER (OUTPATIENT)
Age: 30
End: 2025-05-20

## 2025-05-20 ENCOUNTER — OUTPATIENT (OUTPATIENT)
Dept: OUTPATIENT SERVICES | Facility: HOSPITAL | Age: 30
LOS: 1 days | End: 2025-05-20
Payer: COMMERCIAL

## 2025-05-20 VITALS
HEART RATE: 62 BPM | DIASTOLIC BLOOD PRESSURE: 74 MMHG | OXYGEN SATURATION: 100 % | RESPIRATION RATE: 16 BRPM | SYSTOLIC BLOOD PRESSURE: 127 MMHG | TEMPERATURE: 97 F

## 2025-05-20 VITALS — HEIGHT: 64 IN | WEIGHT: 177.91 LBS

## 2025-05-20 DIAGNOSIS — Z98.890 OTHER SPECIFIED POSTPROCEDURAL STATES: Chronic | ICD-10-CM

## 2025-05-20 DIAGNOSIS — K62.89 OTHER SPECIFIED DISEASES OF ANUS AND RECTUM: ICD-10-CM

## 2025-05-20 PROCEDURE — 46600 DIAGNOSTIC ANOSCOPY SPX: CPT

## 2025-05-20 PROCEDURE — 45990 SURG DX EXAM ANORECTAL: CPT

## 2025-05-20 DEVICE — SURGIFOAM 8 X 12.5CM X 10MM (100): Type: IMPLANTABLE DEVICE | Status: FUNCTIONAL

## 2025-05-20 DEVICE — SURGICEL FIBRILLAR 2 X 4": Type: IMPLANTABLE DEVICE | Status: FUNCTIONAL

## 2025-05-20 RX ORDER — LIDOCAINE HCL/PF 10 MG/ML
0.2 VIAL (ML) INJECTION ONCE
Refills: 0 | Status: COMPLETED | OUTPATIENT
Start: 2025-05-20 | End: 2025-05-20

## 2025-05-20 RX ORDER — FENTANYL CITRATE-0.9 % NACL/PF 100MCG/2ML
50 SYRINGE (ML) INTRAVENOUS
Refills: 0 | Status: DISCONTINUED | OUTPATIENT
Start: 2025-05-20 | End: 2025-05-20

## 2025-05-20 RX ORDER — B1/B2/B3/B5/B6/B12/VIT C/FOLIC 500-0.5 MG
1 TABLET ORAL
Refills: 0 | DISCHARGE

## 2025-05-20 RX ORDER — FENTANYL CITRATE-0.9 % NACL/PF 100MCG/2ML
25 SYRINGE (ML) INTRAVENOUS
Refills: 0 | Status: DISCONTINUED | OUTPATIENT
Start: 2025-05-20 | End: 2025-05-20

## 2025-05-20 RX ORDER — ONDANSETRON HCL/PF 4 MG/2 ML
4 VIAL (ML) INJECTION ONCE
Refills: 0 | Status: DISCONTINUED | OUTPATIENT
Start: 2025-05-20 | End: 2025-06-03

## 2025-05-20 RX ADMIN — Medication 3 MILLILITER(S): at 10:44

## 2025-05-20 RX ADMIN — SODIUM CHLORIDE 100 MILLILITER(S): 9 INJECTION, SOLUTION INTRAVENOUS at 10:44

## 2025-05-20 NOTE — ASU DISCHARGE PLAN (ADULT/PEDIATRIC) - NURSING INSTRUCTIONS
OK to take Tylenol/Acetaminophen at 6'45pm  for pain and every 6 hours after as needed. OK to take Motrin/Ibuprofen for pain and every 6 hours after as needed.

## 2025-05-20 NOTE — ASU DISCHARGE PLAN (ADULT/PEDIATRIC) - NS MD DC FALL RISK RISK
For information on Fall & Injury Prevention, visit: [email or web address] OR  [email or web address] OR  https://www.cdc.gov/steadi/patient.html

## 2025-05-20 NOTE — BRIEF OPERATIVE NOTE - OPERATION/FINDINGS
methylene blue soaked gauze placed in the vagina, dry gauze in the rectum. No blue dye seen on the rectal gauze. No fistula identified

## 2025-05-20 NOTE — PRE-ANESTHESIA EVALUATION ADULT - NSANTHVITALSIGNSFT_GEN_ALL_CORE
ICU Vital Signs Last 24 Hrs  T(C): 36.4 (20 May 2025 09:32), Max: 36.4 (20 May 2025 09:32)  T(F): 97.5 (20 May 2025 09:32), Max: 97.5 (20 May 2025 09:32)  HR: 72 (20 May 2025 09:32) (72 - 72)  BP: 127/84 (20 May 2025 09:32) (127/84 - 127/84)  BP(mean): --  ABP: --  ABP(mean): --  RR: 16 (20 May 2025 09:32) (16 - 16)  SpO2: 100% (20 May 2025 09:32) (100% - 100%)

## 2025-05-20 NOTE — ASU DISCHARGE PLAN (ADULT/PEDIATRIC) - FINANCIAL ASSISTANCE
[facility] provides services at a reduced cost to those who are determined to be eligible through [facility]’s financial assistance program. Information regarding [facility]’s financial assistance program can be found by going to [email or web address] or by calling [phone].

## 2025-05-27 PROBLEM — Z91.040 LATEX ALLERGY STATUS: Chronic | Status: ACTIVE | Noted: 2025-04-29

## 2025-05-27 PROBLEM — K62.89 OTHER SPECIFIED DISEASES OF ANUS AND RECTUM: Chronic | Status: ACTIVE | Noted: 2025-04-29

## 2025-06-04 ENCOUNTER — APPOINTMENT (OUTPATIENT)
Dept: COLORECTAL SURGERY | Facility: CLINIC | Age: 30
End: 2025-06-04
Payer: COMMERCIAL

## 2025-06-04 ENCOUNTER — APPOINTMENT (OUTPATIENT)
Dept: UROGYNECOLOGY | Facility: CLINIC | Age: 30
End: 2025-06-04
Payer: COMMERCIAL

## 2025-06-04 VITALS
DIASTOLIC BLOOD PRESSURE: 79 MMHG | WEIGHT: 175 LBS | SYSTOLIC BLOOD PRESSURE: 108 MMHG | HEART RATE: 77 BPM | HEIGHT: 65 IN | BODY MASS INDEX: 29.16 KG/M2

## 2025-06-04 DIAGNOSIS — N39.41 URGE INCONTINENCE: ICD-10-CM

## 2025-06-04 DIAGNOSIS — N89.8 OTHER SPECIFIED NONINFLAMMATORY DISORDERS OF VAGINA: ICD-10-CM

## 2025-06-04 DIAGNOSIS — R39.15 URGENCY OF URINATION: ICD-10-CM

## 2025-06-04 DIAGNOSIS — R10.2 PELVIC AND PERINEAL PAIN: ICD-10-CM

## 2025-06-04 PROCEDURE — 99213 OFFICE O/P EST LOW 20 MIN: CPT

## 2025-06-04 PROCEDURE — 99212 OFFICE O/P EST SF 10 MIN: CPT

## 2025-06-20 ENCOUNTER — APPOINTMENT (OUTPATIENT)
Dept: COLORECTAL SURGERY | Facility: CLINIC | Age: 30
End: 2025-06-20
Payer: COMMERCIAL

## 2025-06-20 PROCEDURE — 99213 OFFICE O/P EST LOW 20 MIN: CPT

## 2025-06-25 ENCOUNTER — APPOINTMENT (OUTPATIENT)
Dept: MRI IMAGING | Facility: CLINIC | Age: 30
End: 2025-06-25
Payer: COMMERCIAL

## 2025-06-25 ENCOUNTER — OUTPATIENT (OUTPATIENT)
Dept: OUTPATIENT SERVICES | Facility: HOSPITAL | Age: 30
LOS: 1 days | End: 2025-06-25
Payer: COMMERCIAL

## 2025-06-25 DIAGNOSIS — N89.8 OTHER SPECIFIED NONINFLAMMATORY DISORDERS OF VAGINA: ICD-10-CM

## 2025-06-25 DIAGNOSIS — Z98.890 OTHER SPECIFIED POSTPROCEDURAL STATES: Chronic | ICD-10-CM

## 2025-06-25 PROCEDURE — 72197 MRI PELVIS W/O & W/DYE: CPT

## 2025-06-25 PROCEDURE — A9585: CPT

## 2025-06-25 PROCEDURE — 72197 MRI PELVIS W/O & W/DYE: CPT | Mod: 26

## 2025-07-29 ENCOUNTER — APPOINTMENT (OUTPATIENT)
Dept: GYNECOLOGIC ONCOLOGY | Facility: CLINIC | Age: 30
End: 2025-07-29

## 2025-09-02 ENCOUNTER — APPOINTMENT (OUTPATIENT)
Dept: GYNECOLOGIC ONCOLOGY | Facility: CLINIC | Age: 30
End: 2025-09-02
Payer: COMMERCIAL

## 2025-09-02 VITALS
DIASTOLIC BLOOD PRESSURE: 87 MMHG | HEART RATE: 99 BPM | OXYGEN SATURATION: 99 % | WEIGHT: 180 LBS | TEMPERATURE: 98 F | SYSTOLIC BLOOD PRESSURE: 121 MMHG | BODY MASS INDEX: 29.95 KG/M2

## 2025-09-02 DIAGNOSIS — N93.9 ABNORMAL UTERINE AND VAGINAL BLEEDING, UNSPECIFIED: ICD-10-CM

## 2025-09-02 PROCEDURE — 58100 BIOPSY OF UTERUS LINING: CPT

## 2025-09-02 PROCEDURE — 99214 OFFICE O/P EST MOD 30 MIN: CPT | Mod: 25

## 2025-09-02 PROCEDURE — 99459 PELVIC EXAMINATION: CPT | Mod: NC

## 2025-09-04 LAB
BV BACTERIA RRNA VAG QL NAA+PROBE: NOT DETECTED
C GLABRATA RNA VAG QL NAA+PROBE: NOT DETECTED
C TRACH RRNA SPEC QL NAA+PROBE: NOT DETECTED
CANDIDA RRNA VAG QL PROBE: NOT DETECTED
CORE LAB BIOPSY: NORMAL
N GONORRHOEA RRNA SPEC QL NAA+PROBE: NOT DETECTED
T VAGINALIS RRNA SPEC QL NAA+PROBE: NOT DETECTED

## 2025-09-05 ENCOUNTER — APPOINTMENT (OUTPATIENT)
Dept: GYNECOLOGIC ONCOLOGY | Facility: CLINIC | Age: 30
End: 2025-09-05
Payer: COMMERCIAL

## 2025-09-05 PROCEDURE — 99212 OFFICE O/P EST SF 10 MIN: CPT | Mod: 93

## (undated) DEVICE — PLATE NESSY 170

## (undated) DEVICE — POSITIONER STRAP ARMBOARD VELCRO TS-30

## (undated) DEVICE — LABELS BLANK W PEN

## (undated) DEVICE — DRSG COMBINE 5 X 9"

## (undated) DEVICE — LIGASURE SMALL JAW

## (undated) DEVICE — GOWN LG

## (undated) DEVICE — BASIN SET DOUBLE

## (undated) DEVICE — PACK PERI GYN

## (undated) DEVICE — DRSG CURITY GAUZE SPONGE 4 X 4" 12-PLY

## (undated) DEVICE — SPECIMEN CONTAINER 100ML

## (undated) DEVICE — SUT POLYSORB 2-0 30" V-20 UNDYED

## (undated) DEVICE — PROTECTOR HEEL / ELBOW FLUFFY

## (undated) DEVICE — SUT SILK 0 18" TIES

## (undated) DEVICE — WARMING BLANKET FULL ADULT

## (undated) DEVICE — SOL IRR POUR H2O 500ML

## (undated) DEVICE — DRAPE THYROID 77" X 123"

## (undated) DEVICE — SOL IRR POUR H2O 250ML

## (undated) DEVICE — SOL IRR POUR NS 0.9% 500ML

## (undated) DEVICE — WARMING BLANKET UPPER ADULT

## (undated) DEVICE — VENODYNE/SCD SLEEVE CALF MEDIUM

## (undated) DEVICE — FOR-ESU VALLEYLAB T7E14999DX: Type: DURABLE MEDICAL EQUIPMENT

## (undated) DEVICE — GLV 7.5 PROTEXIS (WHITE)

## (undated) DEVICE — TAPE SILK 3"

## (undated) DEVICE — DRAPE LIGHT HANDLE COVER (GREEN)

## (undated) DEVICE — LUBRICATING JELLY ONESHOT 1.25OZ

## (undated) DEVICE — DRSG TELFA 3 X 8

## (undated) DEVICE — DRAPE INSTRUMENT POUCH 6.75" X 11"

## (undated) DEVICE — CATH IV SAFE INSYTE 18G X 1.88" (GREEN)

## (undated) DEVICE — POSITIONER FOAM EGG CRATE ULNAR 2PCS (PINK)

## (undated) DEVICE — PACK PREFILL NEB STERILE WATER 500ML USP

## (undated) DEVICE — PACK MINOR

## (undated) DEVICE — VESSEL LOOP MAXI-RED  0.120" X 16"

## (undated) DEVICE — GOWN XL